# Patient Record
Sex: FEMALE | Race: BLACK OR AFRICAN AMERICAN | Employment: OTHER | URBAN - METROPOLITAN AREA
[De-identification: names, ages, dates, MRNs, and addresses within clinical notes are randomized per-mention and may not be internally consistent; named-entity substitution may affect disease eponyms.]

---

## 2018-06-06 ENCOUNTER — APPOINTMENT (OUTPATIENT)
Dept: MRI IMAGING | Age: 38
End: 2018-06-06
Attending: INTERNAL MEDICINE
Payer: MEDICARE

## 2018-06-06 ENCOUNTER — APPOINTMENT (OUTPATIENT)
Dept: CT IMAGING | Age: 38
End: 2018-06-06
Attending: INTERNAL MEDICINE
Payer: MEDICARE

## 2018-06-06 ENCOUNTER — HOSPITAL ENCOUNTER (OUTPATIENT)
Age: 38
Setting detail: OBSERVATION
Discharge: HOME | End: 2018-06-07
Attending: INTERNAL MEDICINE | Admitting: HOSPITALIST
Payer: MEDICARE

## 2018-06-06 ENCOUNTER — APPOINTMENT (OUTPATIENT)
Dept: GENERAL RADIOLOGY | Age: 38
End: 2018-06-06
Attending: INTERNAL MEDICINE
Payer: MEDICARE

## 2018-06-06 DIAGNOSIS — R41.0 TRANSIENT CONFUSION: ICD-10-CM

## 2018-06-06 DIAGNOSIS — H53.8 BLURRED VISION: ICD-10-CM

## 2018-06-06 DIAGNOSIS — R20.0 FACIAL NUMBNESS: Primary | ICD-10-CM

## 2018-06-06 PROBLEM — G43.109 COMPLICATED MIGRAINE: Status: ACTIVE | Noted: 2018-06-06

## 2018-06-06 PROBLEM — G45.9 TIA (TRANSIENT ISCHEMIC ATTACK): Status: ACTIVE | Noted: 2018-06-06

## 2018-06-06 LAB
ALBUMIN SERPL-MCNC: 3.2 G/DL (ref 3.4–5)
ALBUMIN/GLOB SERPL: 0.6 {RATIO} (ref 0.8–1.7)
ALP SERPL-CCNC: 107 U/L (ref 45–117)
ALT SERPL-CCNC: 48 U/L (ref 13–56)
AMPHET UR QL SCN: NEGATIVE
ANION GAP SERPL CALC-SCNC: 11 MMOL/L (ref 3–18)
APPEARANCE UR: CLEAR
APTT PPP: 27.8 SEC (ref 23–36.4)
AST SERPL-CCNC: 28 U/L (ref 15–37)
BARBITURATES UR QL SCN: NEGATIVE
BASOPHILS # BLD: 0 K/UL (ref 0–0.06)
BASOPHILS NFR BLD: 0 % (ref 0–2)
BENZODIAZ UR QL: NEGATIVE
BILIRUB DIRECT SERPL-MCNC: 0.1 MG/DL (ref 0–0.2)
BILIRUB SERPL-MCNC: 0.2 MG/DL (ref 0.2–1)
BILIRUB UR QL: NEGATIVE
BUN SERPL-MCNC: 11 MG/DL (ref 7–18)
BUN/CREAT SERPL: 12 (ref 12–20)
CALCIUM SERPL-MCNC: 8.9 MG/DL (ref 8.5–10.1)
CANNABINOIDS UR QL SCN: NEGATIVE
CHLORIDE SERPL-SCNC: 103 MMOL/L (ref 100–108)
CK MB CFR SERPL CALC: NORMAL % (ref 0–4)
CK MB SERPL-MCNC: <1 NG/ML (ref 5–25)
CK SERPL-CCNC: 78 U/L (ref 26–192)
CO2 SERPL-SCNC: 24 MMOL/L (ref 21–32)
COCAINE UR QL SCN: NEGATIVE
COLOR UR: YELLOW
CREAT SERPL-MCNC: 0.93 MG/DL (ref 0.6–1.3)
DIFFERENTIAL METHOD BLD: ABNORMAL
EOSINOPHIL # BLD: 0.1 K/UL (ref 0–0.4)
EOSINOPHIL NFR BLD: 2 % (ref 0–5)
ERYTHROCYTE [DISTWIDTH] IN BLOOD BY AUTOMATED COUNT: 13.9 % (ref 11.6–14.5)
GLOBULIN SER CALC-MCNC: 5.5 G/DL (ref 2–4)
GLUCOSE BLD STRIP.AUTO-MCNC: 98 MG/DL (ref 70–110)
GLUCOSE SERPL-MCNC: 101 MG/DL (ref 74–99)
GLUCOSE UR STRIP.AUTO-MCNC: NEGATIVE MG/DL
HCT VFR BLD AUTO: 34.4 % (ref 35–45)
HDSCOM,HDSCOM: NORMAL
HGB BLD-MCNC: 11.5 G/DL (ref 12–16)
HGB UR QL STRIP: NEGATIVE
INR PPP: 1 (ref 0.8–1.2)
KETONES UR QL STRIP.AUTO: NEGATIVE MG/DL
LEUKOCYTE ESTERASE UR QL STRIP.AUTO: NEGATIVE
LYMPHOCYTES # BLD: 2.8 K/UL (ref 0.9–3.6)
LYMPHOCYTES NFR BLD: 37 % (ref 21–52)
MAGNESIUM SERPL-MCNC: 1.7 MG/DL (ref 1.6–2.6)
MCH RBC QN AUTO: 28.7 PG (ref 24–34)
MCHC RBC AUTO-ENTMCNC: 33.4 G/DL (ref 31–37)
MCV RBC AUTO: 85.8 FL (ref 74–97)
METHADONE UR QL: NEGATIVE
MONOCYTES # BLD: 0.5 K/UL (ref 0.05–1.2)
MONOCYTES NFR BLD: 6 % (ref 3–10)
NEUTS SEG # BLD: 4.1 K/UL (ref 1.8–8)
NEUTS SEG NFR BLD: 55 % (ref 40–73)
NITRITE UR QL STRIP.AUTO: NEGATIVE
OPIATES UR QL: NEGATIVE
PCP UR QL: NEGATIVE
PH UR STRIP: 5 [PH] (ref 5–8)
PLATELET # BLD AUTO: 356 K/UL (ref 135–420)
PMV BLD AUTO: 9 FL (ref 9.2–11.8)
POTASSIUM SERPL-SCNC: 3.8 MMOL/L (ref 3.5–5.5)
PROT SERPL-MCNC: 8.7 G/DL (ref 6.4–8.2)
PROT UR STRIP-MCNC: NEGATIVE MG/DL
PROTHROMBIN TIME: 12.7 SEC (ref 11.5–15.2)
RBC # BLD AUTO: 4.01 M/UL (ref 4.2–5.3)
SODIUM SERPL-SCNC: 138 MMOL/L (ref 136–145)
SP GR UR REFRACTOMETRY: >1.03 (ref 1–1.03)
TROPONIN I SERPL-MCNC: <0.02 NG/ML (ref 0–0.06)
UROBILINOGEN UR QL STRIP.AUTO: 1 EU/DL (ref 0.2–1)
WBC # BLD AUTO: 7.4 K/UL (ref 4.6–13.2)

## 2018-06-06 PROCEDURE — 70450 CT HEAD/BRAIN W/O DYE: CPT

## 2018-06-06 PROCEDURE — 70551 MRI BRAIN STEM W/O DYE: CPT

## 2018-06-06 PROCEDURE — 70544 MR ANGIOGRAPHY HEAD W/O DYE: CPT

## 2018-06-06 PROCEDURE — 82550 ASSAY OF CK (CPK): CPT | Performed by: INTERNAL MEDICINE

## 2018-06-06 PROCEDURE — 81003 URINALYSIS AUTO W/O SCOPE: CPT | Performed by: INTERNAL MEDICINE

## 2018-06-06 PROCEDURE — 74011250637 HC RX REV CODE- 250/637: Performed by: INTERNAL MEDICINE

## 2018-06-06 PROCEDURE — 70548 MR ANGIOGRAPHY NECK W/DYE: CPT

## 2018-06-06 PROCEDURE — 99285 EMERGENCY DEPT VISIT HI MDM: CPT

## 2018-06-06 PROCEDURE — 96372 THER/PROPH/DIAG INJ SC/IM: CPT

## 2018-06-06 PROCEDURE — A9577 INJ MULTIHANCE: HCPCS | Performed by: INTERNAL MEDICINE

## 2018-06-06 PROCEDURE — 74011250637 HC RX REV CODE- 250/637: Performed by: HOSPITALIST

## 2018-06-06 PROCEDURE — 99218 HC RM OBSERVATION: CPT

## 2018-06-06 PROCEDURE — 80076 HEPATIC FUNCTION PANEL: CPT | Performed by: INTERNAL MEDICINE

## 2018-06-06 PROCEDURE — 85730 THROMBOPLASTIN TIME PARTIAL: CPT | Performed by: INTERNAL MEDICINE

## 2018-06-06 PROCEDURE — 85610 PROTHROMBIN TIME: CPT | Performed by: INTERNAL MEDICINE

## 2018-06-06 PROCEDURE — 80048 BASIC METABOLIC PNL TOTAL CA: CPT | Performed by: INTERNAL MEDICINE

## 2018-06-06 PROCEDURE — 74011250636 HC RX REV CODE- 250/636: Performed by: HOSPITALIST

## 2018-06-06 PROCEDURE — 93005 ELECTROCARDIOGRAM TRACING: CPT

## 2018-06-06 PROCEDURE — 80307 DRUG TEST PRSMV CHEM ANLYZR: CPT | Performed by: INTERNAL MEDICINE

## 2018-06-06 PROCEDURE — 74011250636 HC RX REV CODE- 250/636: Performed by: INTERNAL MEDICINE

## 2018-06-06 PROCEDURE — 83735 ASSAY OF MAGNESIUM: CPT | Performed by: INTERNAL MEDICINE

## 2018-06-06 PROCEDURE — 82962 GLUCOSE BLOOD TEST: CPT

## 2018-06-06 PROCEDURE — 85025 COMPLETE CBC W/AUTO DIFF WBC: CPT | Performed by: INTERNAL MEDICINE

## 2018-06-06 PROCEDURE — 71045 X-RAY EXAM CHEST 1 VIEW: CPT

## 2018-06-06 RX ORDER — GUAIFENESIN 100 MG/5ML
324 LIQUID (ML) ORAL
Status: COMPLETED | OUTPATIENT
Start: 2018-06-06 | End: 2018-06-06

## 2018-06-06 RX ORDER — BUPROPION HYDROCHLORIDE 150 MG/1
150 TABLET, EXTENDED RELEASE ORAL 2 TIMES DAILY
Status: DISCONTINUED | OUTPATIENT
Start: 2018-06-06 | End: 2018-06-07 | Stop reason: HOSPADM

## 2018-06-06 RX ORDER — FAMOTIDINE 20 MG/1
20 TABLET, FILM COATED ORAL EVERY 12 HOURS
Status: DISCONTINUED | OUTPATIENT
Start: 2018-06-06 | End: 2018-06-07 | Stop reason: HOSPADM

## 2018-06-06 RX ORDER — SODIUM CHLORIDE 9 MG/ML
100 INJECTION, SOLUTION INTRAVENOUS CONTINUOUS
Status: DISCONTINUED | OUTPATIENT
Start: 2018-06-06 | End: 2018-06-07 | Stop reason: HOSPADM

## 2018-06-06 RX ORDER — ACETAMINOPHEN 325 MG/1
650 TABLET ORAL
Status: DISCONTINUED | OUTPATIENT
Start: 2018-06-06 | End: 2018-06-07 | Stop reason: HOSPADM

## 2018-06-06 RX ORDER — GUAIFENESIN 100 MG/5ML
81 LIQUID (ML) ORAL DAILY
Status: DISCONTINUED | OUTPATIENT
Start: 2018-06-07 | End: 2018-06-07 | Stop reason: HOSPADM

## 2018-06-06 RX ORDER — SERTRALINE HYDROCHLORIDE 50 MG/1
100 TABLET, FILM COATED ORAL DAILY
Status: DISCONTINUED | OUTPATIENT
Start: 2018-06-07 | End: 2018-06-07 | Stop reason: HOSPADM

## 2018-06-06 RX ORDER — HEPARIN SODIUM 5000 [USP'U]/ML
5000 INJECTION, SOLUTION INTRAVENOUS; SUBCUTANEOUS EVERY 8 HOURS
Status: DISCONTINUED | OUTPATIENT
Start: 2018-06-06 | End: 2018-06-07 | Stop reason: HOSPADM

## 2018-06-06 RX ORDER — DICYCLOMINE HYDROCHLORIDE 10 MG/1
10 CAPSULE ORAL 4 TIMES DAILY
Status: DISCONTINUED | OUTPATIENT
Start: 2018-06-06 | End: 2018-06-07 | Stop reason: HOSPADM

## 2018-06-06 RX ORDER — SODIUM CHLORIDE 0.9 % (FLUSH) 0.9 %
5-10 SYRINGE (ML) INJECTION EVERY 8 HOURS
Status: DISCONTINUED | OUTPATIENT
Start: 2018-06-06 | End: 2018-06-07 | Stop reason: HOSPADM

## 2018-06-06 RX ORDER — ONDANSETRON 2 MG/ML
4 INJECTION INTRAMUSCULAR; INTRAVENOUS
Status: DISCONTINUED | OUTPATIENT
Start: 2018-06-06 | End: 2018-06-07 | Stop reason: HOSPADM

## 2018-06-06 RX ORDER — KETOROLAC TROMETHAMINE 30 MG/ML
30 INJECTION, SOLUTION INTRAMUSCULAR; INTRAVENOUS
Status: DISCONTINUED | OUTPATIENT
Start: 2018-06-06 | End: 2018-06-07 | Stop reason: HOSPADM

## 2018-06-06 RX ORDER — SODIUM CHLORIDE 0.9 % (FLUSH) 0.9 %
5-10 SYRINGE (ML) INJECTION AS NEEDED
Status: DISCONTINUED | OUTPATIENT
Start: 2018-06-06 | End: 2018-06-07 | Stop reason: HOSPADM

## 2018-06-06 RX ORDER — ATORVASTATIN CALCIUM 20 MG/1
80 TABLET, FILM COATED ORAL
Status: DISCONTINUED | OUTPATIENT
Start: 2018-06-06 | End: 2018-06-07 | Stop reason: HOSPADM

## 2018-06-06 RX ADMIN — DICYCLOMINE HYDROCHLORIDE 10 MG: 10 CAPSULE ORAL at 23:24

## 2018-06-06 RX ADMIN — SODIUM CHLORIDE 100 ML/HR: 900 INJECTION, SOLUTION INTRAVENOUS at 21:18

## 2018-06-06 RX ADMIN — Medication 10 ML: at 21:41

## 2018-06-06 RX ADMIN — ATORVASTATIN CALCIUM 80 MG: 20 TABLET, FILM COATED ORAL at 21:40

## 2018-06-06 RX ADMIN — HEPARIN SODIUM 5000 UNITS: 5000 INJECTION, SOLUTION INTRAVENOUS; SUBCUTANEOUS at 21:40

## 2018-06-06 RX ADMIN — ASPIRIN 324 MG: 81 TABLET, CHEWABLE ORAL at 18:29

## 2018-06-06 RX ADMIN — FAMOTIDINE 20 MG: 20 TABLET, FILM COATED ORAL at 21:40

## 2018-06-06 RX ADMIN — BUPROPION HYDROCHLORIDE 150 MG: 150 TABLET, EXTENDED RELEASE ORAL at 23:24

## 2018-06-06 RX ADMIN — GADOBENATE DIMEGLUMINE 20 ML: 529 INJECTION, SOLUTION INTRAVENOUS at 19:27

## 2018-06-06 NOTE — IP AVS SNAPSHOT
[address] [address] 
[phone] Patient: Mat Diaz MRN: [id number] :[id number] About your hospitalization You were admitted on:  2018 You last received care in the:  [address] [place] [address] You were discharged on:  2018 Why you were hospitalized Your primary diagnosis was:  Tia (Transient Ischemic Attack) Your diagnoses also included:  Complicated Migraine Follow-up Information Follow up With Details Comments Contact Info Dwight Boyce NP On 2018 Follow-up appointment @ 10:10 a.m. [address] [address] 
[phone] Anastasia Ahn MD On 2018 Follow-up appointment @ 11:00 a.m. This is physician's first available appointment. 97 [facility] [phone] [facility] 
[phone] Discharge Orders None A check michelle indicates which time of day the medication should be taken. My Medications CHANGE how you take these medications Instructions Each Dose to Equal  
 Morning Noon Evening Bedtime  
 acetaminophen 500 mg tablet Commonly known as:  TYLENOL What changed:  medication strength Take 1 Tab by mouth every four (4) hours as needed for Pain. 500 mg CONTINUE taking these medications Instructions Each Dose to Equal  
 Morning Noon Evening Bedtime ALLEGRA 180 mg tablet Generic drug:  fexofenadine Take  by mouth daily. buPROPion  mg SR tablet Commonly known as:  Lala Ambrosia Take  by mouth two (2) times a day. dicyclomine 10 mg capsule Commonly known as:  BENTYL Take 10 mg by mouth 4 times daily (before meals and nightly). 10 mg  
    
   
   
   
  
 esomeprazole 40 mg capsule Commonly known as:  Matute Brod Take  by mouth daily. traZODone 100 mg tablet Commonly known as:  Joey Lane Take 100 mg by mouth nightly. 100 mg  
    
   
   
   
  
 VITAMIN D2 50,000 unit capsule Generic drug:  ergocalciferol Take 50,000 Units by mouth. 02975 Units WOMEN'S DAILY PO Take  by mouth daily. ZOLOFT 100 mg tablet Generic drug:  sertraline Take  by mouth daily. STOP taking these medications FLEXERIL PO  
   
  
 HYDROcodone-acetaminophen 5-325 mg per tablet Commonly known as:  NORCO  
   
  
 ondansetron 4 mg disintegrating tablet Commonly known as:  ZOFRAN ODT PERCOCET 5-325 mg per tablet Generic drug:  oxyCODONE-acetaminophen Where to Get Your Medications These medications were sent to [address] [place] [address] Hours:  24-hours Phone:  [phone]  
  acetaminophen 500 mg tablet Discharge Instructions Migraine Headache: Care Instructions Your Care Instructions Migraines are painful, throbbing headaches that often start on one side of the head. They may cause nausea and vomiting and make you sensitive to light, sound, or smell. Without treatment, migraines can last from 4 hours to a few days. Medicines can help prevent migraines or stop them after they have started. Your doctor can help you find which ones work best for you. Follow-up care is a key part of your treatment and safety. Be sure to make and go to all appointments, and call your doctor if you are having problems. It's also a good idea to know your test results and keep a list of the medicines you take. How can you care for yourself at home? · Do not drive if you have taken a prescription pain medicine. · Rest in a quiet, dark room until your headache is gone. Close your eyes, and try to relax or go to sleep. Don't watch TV or read. · Put a cold, moist cloth or cold pack on the painful area for 10 to 20 minutes at a time. Put a thin cloth between the cold pack and your skin. · Use a warm, moist towel or a heating pad set on low to relax tight shoulder and neck muscles. · Have someone gently massage your neck and shoulders. · Take your medicines exactly as prescribed. Call your doctor if you think you are having a problem with your medicine. You will get more details on the specific medicines your doctor prescribes. · Be careful not to take pain medicine more often than the instructions allow. You could get worse or more frequent headaches when the medicine wears off. To prevent migraines · Keep a headache diary so you can figure out what triggers your headaches. Avoiding triggers may help you prevent headaches. Record when each headache began, how long it lasted, and what the pain was like. (Was it throbbing, aching, stabbing, or dull?) Write down any other symptoms you had with the headache, such as nausea, flashing lights or dark spots, or sensitivity to bright light or loud noise. Note if the headache occurred near your period. List anything that might have triggered the headache. Triggers may include certain foods (chocolate, cheese, wine) or odors, smoke, bright light, stress, or lack of sleep. · If your doctor has prescribed medicine for your migraines, take it as directed. You may have medicine that you take only when you get a migraine and medicine that you take all the time to help prevent migraines. ¨ If your doctor has prescribed medicine for when you get a headache, take it at the first sign of a migraine, unless your doctor has given you other instructions. ¨ If your doctor has prescribed medicine to prevent migraines, take it exactly as prescribed. Call your doctor if you think you are having a problem with your medicine. · Find healthy ways to deal with stress.  Migraines are most common during or right after stressful times. Take time to relax before and after you do something that has caused a migraine in the past. 
· Try to keep your muscles relaxed by keeping good posture. Check your jaw, face, neck, and shoulder muscles for tension. Try to relax them. When you sit at a desk, change positions often. And make sure to stretch for 30 seconds each hour. · Get plenty of sleep and exercise. · Eat meals on a regular schedule. Avoid foods and drinks that often trigger migraines. These include chocolate, alcohol (especially red wine and port), aspartame, monosodium glutamate (MSG), and some additives found in foods (such as hot dogs, cohn, cold cuts, aged cheeses, and pickled foods). · Limit caffeine. Don't drink too much coffee, tea, or soda. But don't quit caffeine suddenly. That can also give you migraines. · Do not smoke or allow others to smoke around you. If you need help quitting, talk to your doctor about stop-smoking programs and medicines. These can increase your chances of quitting for good. · If you are taking birth control pills or hormone therapy, talk to your doctor about whether they are triggering your migraines. When should you call for help? Call 911 anytime you think you may need emergency care. For example, call if: 
? · You have signs of a stroke. These may include: 
¨ Sudden numbness, paralysis, or weakness in your face, arm, or leg, especially on only one side of your body. ¨ Sudden vision changes. ¨ Sudden trouble speaking. ¨ Sudden confusion or trouble understanding simple statements. ¨ Sudden problems with walking or balance. ¨ A sudden, severe headache that is different from past headaches. ?Call your doctor now or seek immediate medical care if: 
? · You have new or worse nausea and vomiting. ? · You have a new or higher fever. ? · Your headache gets much worse. ? Watch closely for changes in your health, and be sure to contact your doctor if: ? · You are not getting better after 2 days (48 hours). Where can you learn more? Go to http://zuhair-tin.info/. Enter O341 in the search box to learn more about \"Migraine Headache: Care Instructions. \" Current as of: October 14, 2016 Content Version: 11.4 © 0084-4740 [employer]. Care instructions adapted under license by [employer] (which disclaims liability or warranty for this information). If you have questions about a medical condition or this instruction, always ask your healthcare professional. [place] [address] any warranty or liability for your use of this information. Patient armband removed and shredded DISCHARGE SUMMARY from Nurse PATIENT INSTRUCTIONS: 
 
After general anesthesia or intravenous sedation, for 24 hours or while taking prescription Narcotics: · Limit your activities · Do not drive and operate hazardous machinery · Do not make important personal or business decisions · Do  not drink alcoholic beverages · If you have not urinated within 8 hours after discharge, please contact your surgeon on call. Report the following to your surgeon: 
· Excessive pain, swelling, redness or odor of or around the surgical area · Temperature over 100.5 · Nausea and vomiting lasting longer than 4 hours or if unable to take medications · Any signs of decreased circulation or nerve impairment to extremity: change in color, persistent  numbness, tingling, coldness or increase pain · Any questions What to do at Home: 
Recommended activity: Activity as tolerated If you experience any of the following symptoms heartburn not relieved with over the counter medications or headache not relieved with rest or medication, please follow up with primary care provider. *  Please give a list of your current medications to your Primary Care Provider.  
 
*  Please update this list whenever your medications are discontinued, doses are 
 changed, or new medications (including over-the-counter products) are added. *  Please carry medication information at all times in case of emergency situations. These are general instructions for a healthy lifestyle: No smoking/ No tobacco products/ Avoid exposure to second hand smoke Surgeon General's Warning:  Quitting smoking now greatly reduces serious risk to your health. Obesity, smoking, and sedentary lifestyle greatly increases your risk for illness A healthy diet, regular physical exercise & weight monitoring are important for maintaining a healthy lifestyle You may be retaining fluid if you have a history of heart failure or if you experience any of the following symptoms:  Weight gain of 3 pounds or more overnight or 5 pounds in a week, increased swelling in our hands or feet or shortness of breath while lying flat in bed. Please call your doctor as soon as you notice any of these symptoms; do not wait until your next office visit. Recognize signs and symptoms of STROKE: 
 
F-face looks uneven A-arms unable to move or move unevenly S-speech slurred or non-existent T-time-call 911 as soon as signs and symptoms begin-DO NOT go Back to bed or wait to see if you get better-TIME IS BRAIN. Warning Signs of HEART ATTACK Call 911 if you have these symptoms: 
? Chest discomfort. Most heart attacks involve discomfort in the center of the chest that lasts more than a few minutes, or that goes away and comes back. It can feel like uncomfortable pressure, squeezing, fullness, or pain. ? Discomfort in other areas of the upper body. Symptoms can include pain or discomfort in one or both arms, the back, neck, jaw, or stomach. ? Shortness of breath with or without chest discomfort. ? Other signs may include breaking out in a cold sweat, nausea, or lightheadedness. Don't wait more than five minutes to call [address]!  Fast action can save your life. Calling 911 is almost always the fastest way to get lifesaving treatment. Emergency Medical Services staff can begin treatment when they arrive  up to an hour sooner than if someone gets to the hospital by car. The discharge information has been reviewed with the patient. The patient verbalized understanding. Discharge medications reviewed with the patient and appropriate educational materials and side effects teaching were provided. ___________________________________________________________________________________________________________________________________ Page[employer] People[employer] Announcement We are excited to announce that we are making your provider's discharge notes available to you in [facility]. You will see these notes when they are completed and signed by the physician that discharged you from your recent hospital stay. If you have any questions or concerns about any information you see in [facility], please call the Health Information Department where you were seen or reach out to your Primary Care Provider for more information about your plan of care. Introducing [facility]! [employer] introduces [employer] patient portal. Now you can access parts of your medical record, email your doctor's office, and request medication refills online. 1. In your internet browser, go to [email or web address] 2. Click on the First Time User? Click Here link in the Sign In box. You will see the New Member Sign Up page. 3. Enter your [facility] Access Code exactly as it appears below. You will not need to use this code after youve completed the sign-up process. If you do not sign up before the expiration date, you must request a new code. · [facility] Access Code: [email or web address] Expires: 9/4/2018  5:41 PM 
 
4. Enter the last four digits of your Social Security Number (xxxx) and Date of Birth (mm/dd/yyyy) as indicated and click Submit.  You will be taken to the next sign-up page. 5. Create a [employer]utor[employer] ID. This will be your [employer] login ID and cannot be changed, so think of one that is secure and easy to remember. 6. Create a [employer] password. You can change your password at any time. 7. Enter your Password Reset Question and Answer. This can be used at a later time if you forget your password. 8. Enter your e-mail address. You will receive e-mail notification when new information is available in [facility] 9. Click Sign Up. You can now view and download portions of your medical record. 10. Click the Download Summary menu link to download a portable copy of your medical information. If you have questions, please visit the Frequently Asked Questions section of the [employer] website. Remember, [employer] is NOT to be used for urgent needs. For medical emergencies, dial 911. Now available from your iPhone and Android! Introducing Juan Pablo Regan As a [facility] patient, I wanted to make you aware of our electronic visit tool called Juan Pablo Regan. [address] 24/7 allows you to connect within minutes with a medical provider 24 hours a day, seven days a week via a mobile device or tablet or logging into a secure website from your computer. You can access Juan Pablo Regan from anywhere in the [place]. A virtual visit might be right for you when you have a simple condition and feel like you just dont want to get out of bed, or cant get away from work for an appointment, when your regular [facility] provider is not available (evenings, weekends or holidays), or when youre out of town and need minor care. Electronic visits cost only $49 and if the [facility] 24/7 provider determines a prescription is needed to treat your condition, one can be electronically transmitted to a nearby pharmacy*. Please take a moment to enroll today if you have not already done so.   The enrollment process is free and takes just a few minutes. To enroll, please download the [employer] 24/7 zen to your tablet or phone, or visit [email or web address] to enroll on your computer. And, as an [address] [place] [address] patient with a [employer] account, the results of your visits will be scanned into your electronic medical record and your primary care provider will be able to view the scanned results. We urge you to continue to see your regular [employer] provider for your ongoing medical care. And while your primary care provider may not be the one available when you seek a [facility] virtual visit, the peace of mind you get from getting a real diagnosis real time can be priceless. For more information on [employer], view our Frequently Asked Questions (FAQs) at [email or web address]. Sincerely, 
 
Chanda Mauro MD 
Chief Medical Officer XiangGwendolyn Salomon Reinaldo *:  certain medications cannot be prescribed via [facility] Unresulted Labs-Please follow up with your PCP about these lab tests Order Current Status EKG, 12 LEAD, INITIAL Preliminary result Providers Seen During Your Hospitalization Provider Specialty Primary office phone Lauren Bear MD Emergency Medicine [phone] David Singer MD Family Practice [phone] Your Primary Care Physician (PCP) Primary Care Physician Office Phone Office Fax Rima Mayer [phone] You are allergic to the following Allergen Reactions Shellfish Derived Angioedema Sterapred (Prednisone) Not Reported This Time Recent Documentation Height Weight Breastfeeding? BMI OB Status Smoking Status 1.702 m 95.3 kg No 32.89 kg/m2 Hysterectomy Never Smoker Emergency Contacts Name Discharge Info Relation Home Work Mobile [address] [facility] CAREGIVER [3] Other Relative [6] [phone] Patient Belongings The following personal items are in your possession at time of discharge: 
  Dental Appliances: None  Visual Aid: Glasses (READING)      Home Medications: None   Jewelry: None  Clothing: Undergarments, Footwear, Pants, Shirt, Shorts, With patient    Other Valuables: Ken Boucher [employer], Ian Medal, Money (comment) Please provide this summary of care documentation to your next provider. Signatures-by signing, you are acknowledging that this After Visit Summary has been reviewed with you and you have received a copy. Patient Signature:  ____________________________________________________________ Date:  ____________________________________________________________  
  
[facility] Provider Signature:  ____________________________________________________________ Date:  ____________________________________________________________

## 2018-06-06 NOTE — ED PROVIDER NOTES
EMERGENCY DEPARTMENT HISTORY AND PHYSICAL EXAM    Date: 6/6/2018  Patient Name: Thania Mares    History of Presenting Illness     No chief complaint on file. History Provided By: Patient    Chief Complaint: Numbness  Duration: 30 Minutes  Timing:  Acute  Location: Left face radiating down to left neck  Modifying Factors: Pt states no worsening or relieving sx  Associated Symptoms: confusion, pressure behind left eye, headache and blurred vision    Additional History (Context):   5:46 PM  Thania Mares is a 43 y.o. female with PMHX of IBS who presents to the emergency department C/O left face numbness radiating down to left neck onset 30 minutes ago. Associated sxs include confusion, pressure behind left eye, headache, blurred vision. Pt denies recent fall, PMHx DM, heart problems or stroke, CP, and any other sxs or complaints.      PCP: Ketty Tesfaye NP    Current Facility-Administered Medications   Medication Dose Route Frequency Provider Last Rate Last Dose    0.9% NaCl bacteriostatic (NORMAL SALINE) 0.9 % injection             0.9% sodium chloride infusion  100 mL/hr IntraVENous CONTINUOUS Sekou Schulz  mL/hr at 06/06/18 2118 100 mL/hr at 06/06/18 2118    sodium chloride (NS) flush 5-10 mL  5-10 mL IntraVENous Q8H Sekou Schulz MD   10 mL at 06/07/18 0600    sodium chloride (NS) flush 5-10 mL  5-10 mL IntraVENous PRN Sekou Schulz MD        aspirin chewable tablet 81 mg  81 mg Oral DAILY Sekou Schulz MD   81 mg at 06/07/18 1109    atorvastatin (LIPITOR) tablet 80 mg  80 mg Oral QHS Sekou Schulz MD   80 mg at 06/06/18 2140    acetaminophen (TYLENOL) tablet 650 mg  650 mg Oral Q4H PRN Sekou Schulz MD   650 mg at 06/07/18 1109    famotidine (PEPCID) tablet 20 mg  20 mg Oral Q12H Sekou Schulz MD   20 mg at 06/07/18 1109    heparin (porcine) injection 5,000 Units  5,000 Units SubCUTAneous Karley Mesa MD   5,000 Units at 06/07/18 0357    ondansetron (ZOFRAN) injection 4 mg  4 mg IntraVENous Q6H PRN Lena Mejia MD        buPROPion SR [facility]) tablet 150 mg  150 mg Oral BID Lena Mejia MD   150 mg at 06/07/18 1109    dicyclomine (BENTYL) capsule 10 mg  10 mg Oral QID Lena Mejia MD   10 mg at 06/07/18 1109    sertraline (ZOLOFT) tablet 100 mg  100 mg Oral DAILY Lena Mejia MD   100 mg at 06/07/18 1109    ketorolac (TORADOL) injection 30 mg  30 mg IntraVENous Q6H PRN Lena Mejia MD        acetaminophen (TYLENOL) tablet 650 mg  650 mg Oral Q6H PRN Lena Mejia MD        ondansetron [facility]) injection 4 mg  4 mg IntraVENous Q6H PRN Lena Mejia MD           Past History     Past Medical History:  Past Medical History:   Diagnosis Date    Arthropathy     GERD (gastroesophageal reflux disease)     IBS (irritable bowel syndrome)     Mental disorder     Psychiatric disorder     PTSD (post-traumatic stress disorder)     UTI (lower urinary tract infection)        Past Surgical History:  Past Surgical History:   Procedure Laterality Date    CYSTOURETHROSCOPY  7/1/2015         HX CARPAL TUNNEL RELEASE      HX HERNIA REPAIR      HX HYSTERECTOMY      HX KNEE ARTHROSCOPY      Right       Family History:  History reviewed. No pertinent family history. Social History:  Social History   Substance Use Topics    Smoking status: Never Smoker    Smokeless tobacco: Never Used    Alcohol use 0.0 oz/week     0 Standard drinks or equivalent per week      Comment: socially        Allergies: Allergies   Allergen Reactions    Shellfish Derived Angioedema    Sterapred [Prednisone] Not Reported This Time         Review of Systems   Review of Systems   Eyes: Positive for pain (pain behind left eye) and visual disturbance. Cardiovascular: Negative for chest pain. Neurological: Positive for weakness (left sided), numbness (left sided) and headaches. Psychiatric/Behavioral: Positive for confusion. All other systems reviewed and are negative.       Physical Exam     Vitals: 06/07/18 0547 06/07/18 0629 06/07/18 0728 06/07/18 1155   BP: 100/60 99/62 99/69 127/87   Pulse: 78 79 74 89   Resp: 16 17 17 17   Temp: 97.8 °F (36.6 °C) 97.6 °F (36.4 °C) 98 °F (36.7 °C) 97.4 °F (36.3 °C)   SpO2: 100% 100% 100% 100%   Weight:       Height:         Physical Exam   Constitutional: She is oriented to person, place, and time. She appears well-developed and well-nourished. HENT:   Head: Normocephalic and atraumatic. Right Ear: External ear normal.   Left Ear: External ear normal.   Nose: Nose normal.   Mouth/Throat: Oropharynx is clear and moist.   Eyes: Conjunctivae and EOM are normal. Pupils are equal, round, and reactive to light. Right eye exhibits no discharge. Left eye exhibits no discharge. No scleral icterus. Neck: Normal range of motion. Neck supple. No JVD present. No tracheal deviation present. Cardiovascular: Normal rate, regular rhythm, normal heart sounds and intact distal pulses. Pulmonary/Chest: Effort normal and breath sounds normal.   Abdominal: Soft. Bowel sounds are normal. She exhibits no distension. There is no tenderness. No HSM   Musculoskeletal: Normal range of motion. Neurological: She is alert and oriented to person, place, and time. She has normal reflexes. No focal motor weakness. Skin: Skin is warm and dry. No rash noted. Psychiatric: Her behavior is normal.   Flat affect   Nursing note and vitals reviewed.     Diagnostic Study Results     Labs -     Recent Results (from the past 12 hour(s))   HEMOGLOBIN A1C WITH EAG    Collection Time: 06/07/18  2:58 AM   Result Value Ref Range    Hemoglobin A1c 5.2 4.5 - 5.6 %    Est. average glucose 800 mg/dL   METABOLIC PANEL, BASIC    Collection Time: 06/07/18  2:58 AM   Result Value Ref Range    Sodium 139 136 - 145 mmol/L    Potassium 4.1 3.5 - 5.5 mmol/L    Chloride 105 100 - 108 mmol/L    CO2 27 21 - 32 mmol/L    Anion gap 7 3.0 - 18 mmol/L    Glucose 117 (H) 74 - 99 mg/dL    BUN 9 7.0 - 18 MG/DL Creatinine 0.90 0.6 - 1.3 MG/DL    BUN/Creatinine ratio 10 (L) 12 - 20      GFR est AA >60 >60 ml/min/1.73m2    GFR est non-AA >60 >60 ml/min/1.73m2    Calcium 8.5 8.5 - 10.1 MG/DL   CBC W/O DIFF    Collection Time: 06/07/18  2:58 AM   Result Value Ref Range    WBC 7.6 4.6 - 13.2 K/uL    RBC 3.82 (L) 4.20 - 5.30 M/uL    HGB 10.8 (L) 12.0 - 16.0 g/dL    HCT 33.2 (L) 35.0 - 45.0 %    MCV 86.9 74.0 - 97.0 FL    MCH 28.3 24.0 - 34.0 PG    MCHC 32.5 31.0 - 37.0 g/dL    RDW 14.1 11.6 - 14.5 %    PLATELET 207 546 - 217 K/uL    MPV 9.1 (L) 9.2 - 11.8 FL   LIPID PANEL    Collection Time: 06/07/18  2:58 AM   Result Value Ref Range    LIPID PROFILE          Cholesterol, total 153 <200 MG/DL    Triglyceride 96 <150 MG/DL    HDL Cholesterol 40 40 - 60 MG/DL    LDL, calculated 93.8 0 - 100 MG/DL    VLDL, calculated 19.2 MG/DL    CHOL/HDL Ratio 3.8 0 - 5.0         Radiologic Studies -   7:13 PM  RADIOLOGY FINDINGS  Chest X-ray shows no acute process  Pending review by Radiologist  Recorded by Oliverio Trammell, ED Scribe, as dictated by Minnie Francisco MD    MRA NECK W CONT   Final Result      MRA BRAIN WO CONT   Final Result      MRI BRAIN WO CONT   Final Result      XR CHEST PORT   Final Result      CT HEAD WO CONT   Final Result        CT Results  (Last 48 hours)               06/06/18 1803  CT HEAD WO CONT Final result    Impression:  IMPRESSION:       1. No acute intracranial abnormalities. These findings were discussed with Dr. Haylie Caceres at [facility] PM on 6/6/2018. Narrative:  EXAM: CT head       INDICATION: Left facial weakness, confusion, headache. COMPARISON: None. TECHNIQUE: Axial CT imaging of the head was performed without intravenous   contrast. One or more dose reduction techniques were used on this CT: automated   exposure control, adjustment of the mAs and/or kVp according to patient's size,   and iterative reconstruction techniques.  The specific techniques utilized on   this CT exam have been documented in the patient's electronic medical record.       _______________       FINDINGS:       BRAIN AND EXTRA-AXIAL SPACES: There is no intracranial hemorrhage, mass effect,   or midline shift. There are no areas of abnormal parenchymal attenuation. There   are no abnormal extra-axial fluid collections. CALVARIUM: Intact. SINUSES: Clear. OTHER: None.       _______________               CXR Results  (Last 48 hours)               06/06/18 1829  XR CHEST PORT Final result    Impression:  Impression:   --------------       No active pulmonary disease. Narrative:  ---------------------------------------------------------------------------   <<<<<<<<<           [facility]           >>>>>>>>>    ---------------------------------------------------------------------------       CLINICAL HISTORY:  Chest pain. Shortness of breath. COMPARISON EXAMINATIONS:  None. ---  SINGLE FRONTAL VIEW OF THE CHEST  ---       The lungs and pleural spaces are clear. The mediastinum is unremarkable in   appearance.   No significant osseous abnormalities are identified.             --------------             Medications given in the ED-  Medications   0.9% sodium chloride infusion (100 mL/hr IntraVENous New Bag 6/6/18 2118)   sodium chloride (NS) flush 5-10 mL (10 mL IntraVENous Given 6/7/18 0600)   sodium chloride (NS) flush 5-10 mL (not administered)   aspirin chewable tablet 81 mg (81 mg Oral Given 6/7/18 1109)   atorvastatin (LIPITOR) tablet 80 mg (80 mg Oral Given 6/6/18 2140)   acetaminophen (TYLENOL) tablet 650 mg (650 mg Oral Given 6/7/18 1109)   famotidine (PEPCID) tablet 20 mg (20 mg Oral Given 6/7/18 1109)   heparin (porcine) injection 5,000 Units (5,000 Units SubCUTAneous Given 6/7/18 7059)   ondansetron (ZOFRAN) injection 4 mg (not administered)   buPROPion SR (WELLBUTRIN SR) tablet 150 mg (150 mg Oral Given 6/7/18 1109)   dicyclomine (BENTYL) capsule 10 mg (10 mg Oral Given 6/7/18 1109)   sertraline (ZOLOFT) tablet 100 mg (100 mg Oral Given 6/7/18 1109)   ketorolac (TORADOL) injection 30 mg (not administered)   acetaminophen (TYLENOL) tablet 650 mg (not administered)   ondansetron (ZOFRAN) injection 4 mg (not administered)   0.9% NaCl bacteriostatic (NORMAL SALINE) 0.9 % injection (not administered)   aspirin chewable tablet 324 mg (324 mg Oral Given 6/6/18 1829)   Gadobenate Dimeglumine (MULTIHANCE) 529 mg/mL (0.1mmol/0.2mL) contrast injection 20 mL (20 mL IntraVENous Given 6/6/18 1927)   0.9% NaCl bacteriostatic (NORMAL SALINE) 0.9 % injection 15 mL (15 mL IntraVENous Given 6/7/18 1026)         Medical Decision Making   I am the first provider for this patient. I reviewed the vital signs, available nursing notes, past medical history, past surgical history, family history and social history. Vital Signs-Reviewed the patient's vital signs. Pulse Oximetry Analysis - 100% on Room Air     EKG interpretation: (Preliminary)  6:23 PM   76 BPM, NSR, no ST elevation  EKG read by Nava Mondragon MD at 6:25 PM     Records Reviewed: Nursing Notes    Provider Notes (Medical Decision Making):   DDx: Chantelle Keepers, cva, stroke, migraine headache, conversion disorder, psych    Procedures:  Procedures    ED Course:   5:46 PM   Initial assessment performed. The patients presenting problems have been discussed, and they are in agreement with the care plan formulated and outlined with them. I have encouraged them to ask questions as they arise throughout their visit.    6:02 PM  The radiologist called to say the CT showed no acute process. CONSULT NOTE:   6:22 Blanka Spencer MD spoke with Dr. Lucille Randall MD   Specialty: Tele neurologist  Discussed pt's hx, disposition, and available diagnostic and imaging results  over the telephone. Reviewed care plans. Consulting physician agrees with plans as outlined.   Give 324 of aspirin, needs to admitted for stroke workup and do not cancel code s. Ddx: tia, seizure, complicated migraine. Will need head and neck MRI, also needs eeg, echo, and neurologist evaluation as inpatient or outpatient. CONSULT NOTE:   6:38 PM  Kamla Walton MD spoke with Dr. Randall Kehr, MD   Specialty: Tele neurologist  Discussed pt's hx, disposition, and available diagnostic and imaging results  over the telephone. Reviewed care plans. Consulting physician agrees with plans as outlined. Confirmed that she wants MRI head without and MRA brain and neck. CONSULT NOTE:   7:11 PM  Kamla Walton MD spoke with Alan Richardson MD   Specialty: Internal Medicine  Discussed pt's hx, disposition, and available diagnostic and imaging results  over the telephone. Reviewed care plans. Consulting physician agrees with plans as outlined. She agrees to admit to tele obs.      7:43 PM  Preliminary MRI head and MRA head and neck results are negative per radiologist reading, will have neuroradiologist evaluate the films in AM next day. Diagnosis and Disposition       Core Measures:  For Hospitalized Patients:    1. Hospitalization Decision Time:  The decision to hospitalize the patient was made by Kamla Walton MD at 6:22 PM on 6/6/2018    2. Aspirin: Aspirin was given    7:11 PM  Patient is being admitted to the hospital by Alan iRchardson MD. The results of their tests and reasons for their admission have been discussed with them and/or available family. They convey agreement and understanding for the need to be admitted and for their admission diagnosis. CLINICAL IMPRESSION:    1. Facial numbness    2. Blurred vision    3. Transient confusion        PLAN:    1. Admit to Tele Obs.  _______________________________    Attestations: This note is prepared by Shin Bailey, acting as Scribe for Kamla Walotn MD.    Kamla Walton MD:  The scribe's documentation has been prepared under my direction and personally reviewed by me in its entirety.   I confirm that the note above accurately reflects all work, treatment, procedures, and medical decision making performed by me.  _______________________________

## 2018-06-06 NOTE — IP AVS SNAPSHOT
Charli Joyce 
 
 
 [address] [place] [id number] 
[phone] Patient: Talia Dupont MRN: [id number] :1980 A check michelle indicates which time of day the medication should be taken. My Medications CHANGE how you take these medications Instructions Each Dose to Equal  
 Morning Noon Evening Bedtime  
 acetaminophen 500 mg tablet Commonly known as:  TYLENOL What changed:  medication strength Take 1 Tab by mouth every four (4) hours as needed for Pain. 500 mg CONTINUE taking these medications Instructions Each Dose to Equal  
 Morning Noon Evening Bedtime ALLEGRA 180 mg tablet Generic drug:  fexofenadine Take  by mouth daily. buPROPion  mg SR tablet Commonly known as:  Krissy Drain Take  by mouth two (2) times a day. dicyclomine 10 mg capsule Commonly known as:  BENTYL Take 10 mg by mouth 4 times daily (before meals and nightly). 10 mg  
    
   
   
   
  
 esomeprazole 40 mg capsule Commonly known as:  Juanetta Budds Take  by mouth daily. traZODone 100 mg tablet Commonly known as:  Noreene Wilfred Take 100 mg by mouth nightly. 100 mg  
    
   
   
   
  
 VITAMIN D2 50,000 unit capsule Generic drug:  ergocalciferol Take 50,000 Units by mouth. 93320 Units WOMEN'S DAILY PO Take  by mouth daily. ZOLOFT 100 mg tablet Generic drug:  sertraline Take  by mouth daily. STOP taking these medications FLEXERIL PO  
   
  
 HYDROcodone-acetaminophen 5-325 mg per tablet Commonly known as:  NORCO  
   
  
 ondansetron 4 mg disintegrating tablet Commonly known as:  ZOFRAN ODT PERCOCET 5-325 mg per tablet Generic drug:  oxyCODONE-acetaminophen Where to Get Your Medications These medications were sent to [id number] [place] [address], [address], [facility] Josselyn Mak [address] Hours:  24-hours Phone:  [phone]  
  acetaminophen 500 mg tablet

## 2018-06-06 NOTE — PROGRESS NOTES
Preliminary interpretation for MRI/MRA brain dated June 6, 2018 and MRA neck dated June 6, 2018    No acute abnormalities identified in the brain. No evidence of acute infarct, hemorrhage, or mass effect. No aneurysm involving the intracranial vasculature. No high-grade stenosis or dissection involving the intracranial or extracranial vasculature. Full report by neuroradiology to follow on June 7, 2018. Discussed with Dr. Ron Sloan at 7:42 PM on 6/6/2018.

## 2018-06-06 NOTE — Clinical Note
Patient Class[de-identified] Observation [873] Type of Bed: Telemetry [19] Reason for Observation: weakness/numb left facial, blurred vision, dizziness, - tia vs sz, migraine Admitting Diagnosis: TIA (transient ischemic attack) [605403] Admitting Physician: Elizabeth Eisenberg [[id number]] Attending Physician: Elizabeth Eisenberg [[id number]]

## 2018-06-06 NOTE — ED NOTES
Bedside shift change report given to Timo kapadia (oncoming nurse) by yesenia robertson (offgoing nurse). Report included the following information SBAR, Kardex, ED Summary, MAR, Recent Results and Cardiac Rhythm NSR.

## 2018-06-06 NOTE — ED TRIAGE NOTES
Patient states left sided facial and ear numbness. Patient states pressure behind left eye. Onset 30 minutes ago.

## 2018-06-07 VITALS
DIASTOLIC BLOOD PRESSURE: 87 MMHG | WEIGHT: 210 LBS | RESPIRATION RATE: 17 BRPM | SYSTOLIC BLOOD PRESSURE: 127 MMHG | OXYGEN SATURATION: 100 % | TEMPERATURE: 97.4 F | HEIGHT: 67 IN | HEART RATE: 89 BPM | BODY MASS INDEX: 32.96 KG/M2

## 2018-06-07 LAB
ANION GAP SERPL CALC-SCNC: 7 MMOL/L (ref 3–18)
BUN SERPL-MCNC: 9 MG/DL (ref 7–18)
BUN/CREAT SERPL: 10 (ref 12–20)
CALCIUM SERPL-MCNC: 8.5 MG/DL (ref 8.5–10.1)
CHLORIDE SERPL-SCNC: 105 MMOL/L (ref 100–108)
CHOLEST SERPL-MCNC: 153 MG/DL
CO2 SERPL-SCNC: 27 MMOL/L (ref 21–32)
CREAT SERPL-MCNC: 0.9 MG/DL (ref 0.6–1.3)
ERYTHROCYTE [DISTWIDTH] IN BLOOD BY AUTOMATED COUNT: 14.1 % (ref 11.6–14.5)
EST. AVERAGE GLUCOSE BLD GHB EST-MCNC: 103 MG/DL
GLUCOSE SERPL-MCNC: 117 MG/DL (ref 74–99)
HBA1C MFR BLD: 5.2 % (ref 4.5–5.6)
HCT VFR BLD AUTO: 33.2 % (ref 35–45)
HDLC SERPL-MCNC: 40 MG/DL (ref 40–60)
HDLC SERPL: 3.8 {RATIO} (ref 0–5)
HGB BLD-MCNC: 10.8 G/DL (ref 12–16)
LDLC SERPL CALC-MCNC: 93.8 MG/DL (ref 0–100)
LIPID PROFILE,FLP: NORMAL
MCH RBC QN AUTO: 28.3 PG (ref 24–34)
MCHC RBC AUTO-ENTMCNC: 32.5 G/DL (ref 31–37)
MCV RBC AUTO: 86.9 FL (ref 74–97)
PLATELET # BLD AUTO: 344 K/UL (ref 135–420)
PMV BLD AUTO: 9.1 FL (ref 9.2–11.8)
POTASSIUM SERPL-SCNC: 4.1 MMOL/L (ref 3.5–5.5)
RBC # BLD AUTO: 3.82 M/UL (ref 4.2–5.3)
SODIUM SERPL-SCNC: 139 MMOL/L (ref 136–145)
TRIGL SERPL-MCNC: 96 MG/DL (ref ?–150)
VLDLC SERPL CALC-MCNC: 19.2 MG/DL
WBC # BLD AUTO: 7.6 K/UL (ref 4.6–13.2)

## 2018-06-07 PROCEDURE — 80048 BASIC METABOLIC PNL TOTAL CA: CPT | Performed by: HOSPITALIST

## 2018-06-07 PROCEDURE — G8989 SELF CARE D/C STATUS: HCPCS

## 2018-06-07 PROCEDURE — 74011000250 HC RX REV CODE- 250: Performed by: HOSPITALIST

## 2018-06-07 PROCEDURE — 80061 LIPID PANEL: CPT | Performed by: HOSPITALIST

## 2018-06-07 PROCEDURE — 97161 PT EVAL LOW COMPLEX 20 MIN: CPT

## 2018-06-07 PROCEDURE — G8998 SWALLOW D/C STATUS: HCPCS

## 2018-06-07 PROCEDURE — G8987 SELF CARE CURRENT STATUS: HCPCS

## 2018-06-07 PROCEDURE — G8988 SELF CARE GOAL STATUS: HCPCS

## 2018-06-07 PROCEDURE — 74011250637 HC RX REV CODE- 250/637: Performed by: HOSPITALIST

## 2018-06-07 PROCEDURE — 96372 THER/PROPH/DIAG INJ SC/IM: CPT

## 2018-06-07 PROCEDURE — 97165 OT EVAL LOW COMPLEX 30 MIN: CPT

## 2018-06-07 PROCEDURE — G8978 MOBILITY CURRENT STATUS: HCPCS

## 2018-06-07 PROCEDURE — 83036 HEMOGLOBIN GLYCOSYLATED A1C: CPT | Performed by: HOSPITALIST

## 2018-06-07 PROCEDURE — 92610 EVALUATE SWALLOWING FUNCTION: CPT

## 2018-06-07 PROCEDURE — G8979 MOBILITY GOAL STATUS: HCPCS

## 2018-06-07 PROCEDURE — 85027 COMPLETE CBC AUTOMATED: CPT | Performed by: HOSPITALIST

## 2018-06-07 PROCEDURE — 74011250636 HC RX REV CODE- 250/636: Performed by: HOSPITALIST

## 2018-06-07 PROCEDURE — 96374 THER/PROPH/DIAG INJ IV PUSH: CPT

## 2018-06-07 PROCEDURE — 36415 COLL VENOUS BLD VENIPUNCTURE: CPT | Performed by: HOSPITALIST

## 2018-06-07 PROCEDURE — 99218 HC RM OBSERVATION: CPT

## 2018-06-07 PROCEDURE — G8997 SWALLOW GOAL STATUS: HCPCS

## 2018-06-07 PROCEDURE — G8980 MOBILITY D/C STATUS: HCPCS

## 2018-06-07 PROCEDURE — G8996 SWALLOW CURRENT STATUS: HCPCS

## 2018-06-07 RX ORDER — SODIUM CHLORIDE 9 MG/ML
INJECTION INTRAMUSCULAR; INTRAVENOUS; SUBCUTANEOUS
Status: DISCONTINUED
Start: 2018-06-07 | End: 2018-06-07 | Stop reason: HOSPADM

## 2018-06-07 RX ORDER — ACETAMINOPHEN 500 MG
500 TABLET ORAL
Qty: 16 TAB | Refills: 0 | Status: SHIPPED | OUTPATIENT
Start: 2018-06-07

## 2018-06-07 RX ORDER — SODIUM CHLORIDE 9 MG/ML
15 INJECTION INTRAMUSCULAR; INTRAVENOUS; SUBCUTANEOUS ONCE
Status: COMPLETED | OUTPATIENT
Start: 2018-06-07 | End: 2018-06-07

## 2018-06-07 RX ADMIN — SODIUM CHLORIDE 15 ML: 9 INJECTION, SOLUTION INTRAMUSCULAR; INTRAVENOUS; SUBCUTANEOUS at 10:26

## 2018-06-07 RX ADMIN — DICYCLOMINE HYDROCHLORIDE 10 MG: 10 CAPSULE ORAL at 11:09

## 2018-06-07 RX ADMIN — ASPIRIN 81 MG: 81 TABLET, CHEWABLE ORAL at 11:09

## 2018-06-07 RX ADMIN — BUPROPION HYDROCHLORIDE 150 MG: 150 TABLET, EXTENDED RELEASE ORAL at 11:09

## 2018-06-07 RX ADMIN — SERTRALINE HYDROCHLORIDE 100 MG: 50 TABLET ORAL at 11:09

## 2018-06-07 RX ADMIN — ACETAMINOPHEN 650 MG: 325 TABLET ORAL at 03:57

## 2018-06-07 RX ADMIN — HEPARIN SODIUM 5000 UNITS: 5000 INJECTION, SOLUTION INTRAVENOUS; SUBCUTANEOUS at 03:57

## 2018-06-07 RX ADMIN — Medication 10 ML: at 06:00

## 2018-06-07 RX ADMIN — ACETAMINOPHEN 650 MG: 325 TABLET ORAL at 11:09

## 2018-06-07 RX ADMIN — FAMOTIDINE 20 MG: 20 TABLET, FILM COATED ORAL at 11:09

## 2018-06-07 NOTE — PROGRESS NOTES
Chart reviewed. Pt admitted observation status for TIA. PT/OT/Speech eval orders noted. CM will follow for discharge planning needs. 1120  Met with patient at bedside. Per Matias ZUÑIGA, pt will dc home today. Per pt, she has been ambulating without difficulty. Pt states she is independent and denies using any DME. Denies currently using any [place] services. Per Matias ZUÑIGA, pt will need neuro follow up. He was made aware MD García's office will contact pt for appt. Kurtis James will assist in making appt for PCP follow up. Pt states a friend/family will drive her home at discharge. Reason for Admission:   Per chart, pt is a 79-year-old female with a history of anxiety and depression came to the hospital with left-sided facial pain and numbness that radiated down her face and to her left arm about 4:00 today. She had associated pain and pressure behind her left eye, a frontal-type headache, and had never had any symptoms like this before. When she came into the hospital they called a code S. She was evaluated by the teleneurologist, as well as the ER physician. They did a CT of her head which was negative for acute stroke, and recommendation was to admit the patient for observation, perform an MRI and MRA of the head and neck, and to evaluate further for possible TIA. Her PCP is nurse practitioner, Maliha William. RRAT Score:     8, low                 Plan for utilizing home health:      TBD, awaiting PT/OT eval                    Likelihood of Readmission:   Moderate, considering TIA dx                         Transition of Care Plan:              TBD          Care Management Interventions  Transition of Care Consult (CM Consult): Discharge Planning  Physical Therapy Consult: Yes  Occupational Therapy Consult: Yes  Speech Therapy Consult:  Yes

## 2018-06-07 NOTE — ROUTINE PROCESS
TRANSFER - OUT REPORT:    Verbal report given to Jackie Priest RN(name) on Virgie Rose  being transferred to Tele(unit) for routine progression of care       Report consisted of patients Situation, Background, Assessment and   Recommendations(SBAR). Information from the following report(s) SBAR was reviewed with the receiving nurse. Lines:   Peripheral IV 06/06/18 Right Antecubital (Active)   Site Assessment Clean, dry, & intact 6/6/2018  6:17 PM   Phlebitis Assessment 0 6/6/2018  6:17 PM   Infiltration Assessment 0 6/6/2018  6:17 PM   Dressing Status Clean, dry, & intact 6/6/2018  6:17 PM   Dressing Type Transparent 6/6/2018  6:17 PM        Opportunity for questions and clarification was provided.       Patient transported with:   Monitor  Registered Nurse

## 2018-06-07 NOTE — PROGRESS NOTES
Problem: Self Care Deficits Care Plan (Adult)  Goal: *Acute Goals and Plan of Care (Insert Text)  Occupational Therapy EVALUATION/discharge    Patient: Esperanza Izaguirre (42 y.o. female)  Date: 6/7/2018  Primary Diagnosis: TIA (transient ischemic attack)  TIA (transient ischemic attack)        Precautions:  Fall    ASSESSMENT AND RECOMMENDATIONS:  Based on the objective data described below, the patient presents with left sided facial numbness and left UE numbness. Pt does not have any residual left UE weakness at this time. Pt completed transfers and functional mobility independently. Pt completed UB and LB ADL independently. No coordination deficits. Pt reported pressure in left eye still and headache. No further skilled acute OT needs indicated at this time. Will sign off. Skilled occupational therapy is not indicated at this time. Discharge Recommendations: None  Further Equipment Recommendations for Discharge: N/A      SUBJECTIVE:   Patient stated I'm doing fine now.     OBJECTIVE DATA SUMMARY:     Past Medical History:   Diagnosis Date    Arthropathy     GERD (gastroesophageal reflux disease)     IBS (irritable bowel syndrome)     Mental disorder     Psychiatric disorder     PTSD (post-traumatic stress disorder)     UTI (lower urinary tract infection)      Past Surgical History:   Procedure Laterality Date    CYSTOURETHROSCOPY  7/1/2015         HX CARPAL TUNNEL RELEASE      HX HERNIA REPAIR      HX HYSTERECTOMY      HX KNEE ARTHROSCOPY      Right     Barriers to Learning/Limitations: None  Compensate with: visual, verbal, tactile, kinesthetic cues/model  Prior Level of Function/Home Situation: I with ADLs prior to admission  Home Situation  Home Environment: Private residence  # Steps to Enter: 3  Rails to Enter: Yes  Hand Rails : Bilateral  One/Two Story Residence: Two story  # of Interior Steps: 10  Interior Rails: Both  Living Alone: No  Support Systems: Family member(s)  Patient Expects to be Discharged to[de-identified] Private residence  Current DME Used/Available at Home: None  Tub or Shower Type: Tub/Shower combination  []     Right hand dominant   []     Left hand dominant  Cognitive/Behavioral Status:  Neurologic State: Alert; Appropriate for age  Orientation Level: Oriented X4  Cognition: Appropriate decision making; Appropriate for age attention/concentration; Appropriate safety awareness; Follows commands  Safety/Judgement: Fall prevention;Good awareness of safety precautions  Skin: intact  Edema: none noted  Vision/Perceptual:  N/A  Coordination:  Coordination: Within functional limits  Fine Motor Skills-Upper: Left Intact; Right Intact    Gross Motor Skills-Upper: Left Intact; Right Intact  Balance:  Sitting: Intact  Standing: Intact; Without support  Strength:  Strength: Within functional limits  Tone & Sensation:  Tone: Normal  Sensation: Intact  Range of Motion:  AROM: Within functional limits  Functional Mobility and Transfers for ADLs:  Bed Mobility:  Supine to Sit: Independent  Sit to Supine: Independent  Transfers:  Sit to Stand: Independent  ADL Assessment:  Upper Body Dressing: Independent  Lower Body Dressing: Independent  ADL Intervention:  Cognitive Retraining  Safety/Judgement: Fall prevention;Good awareness of safety precautions    Pain:  Pain Scale 1: Numeric (0 - 10)  Pain Intensity 1: 0  Pain Location 1: Eye  Pain Orientation 1: Left  Pain Description 1: Pressure  Pain Intervention(s) 1: Medication (see MAR)  Activity Tolerance:   good  Please refer to the flowsheet for vital signs taken during this treatment.   After treatment:   []  Patient left in no apparent distress sitting up in chair  [x]  Patient left in no apparent distress in bed  [x]  Call bell left within reach  []  Nursing notified  []  Caregiver present  []  Bed alarm activated    COMMUNICATION/EDUCATION:   Communication/Collaboration:  [x]      Home safety education was provided and the patient/caregiver indicated understanding. [x]      Patient/family have participated as able and agree with findings and recommendations. []      Patient is unable to participate in plan of care at this time. Damon Rubio, OTR/L  Time Calculation: 10 mins     Carry  Current  CH= 0%    Goal  CH= 0%   D/C  CH= 0%. The severity rating is based on the Level of Assistance required for Functional Mobility and ADLs.

## 2018-06-07 NOTE — PROGRESS NOTES
Problem: Falls - Risk of  Goal: *Absence of Falls  Document Lenny Fall Risk and appropriate interventions in the flowsheet.    Outcome: Progressing Towards Goal  Fall Risk Interventions:            Medication Interventions: Patient to call before getting OOB, Teach patient to arise slowly

## 2018-06-07 NOTE — PROGRESS NOTES
Problem: Falls - Risk of  Goal: *Absence of Falls  Document Lenny Fall Risk and appropriate interventions in the flowsheet.   Outcome: Progressing Towards Goal  Fall Risk Interventions:

## 2018-06-07 NOTE — DISCHARGE SUMMARY
Discharge Summary    Patient: Dwyane Goodpasture MRN: [id number]  CSN: [id number]    YOB: 1980  Age: 43 y.o. Sex: female    DOA: 6/6/2018 LOS:  LOS: 0 days   Discharge Date:      Primary Care Provider:  Sami Strange NP    Admission Diagnoses: TIA (transient ischemic attack)  TIA (transient ischemic attack)    Discharge Diagnoses:    Problem List as of 6/7/2018  Date Reviewed: 7/1/2015          Codes Class Noted - Resolved    * (Principal)TIA (transient ischemic attack) ICD-10-CM: G45.9  ICD-9-CM: 435.9  6/6/2018 - Present        Complicated migraine CWF-44-JR: G43.109  ICD-9-CM: 346.00  6/6/2018 - Present        Bladder endometriosis ICD-10-CM: N80.8  ICD-9-CM: 617.8  7/1/2015 - Present              Discharge Medications:     Current Discharge Medication List      CONTINUE these medications which have CHANGED    Details   acetaminophen (TYLENOL) 500 mg tablet Take 1 Tab by mouth every four (4) hours as needed for Pain. Qty: 16 Tab, Refills: 0         CONTINUE these medications which have NOT CHANGED    Details   MULTIVIT WITH CALCIUM,IRON,MIN (WOMEN'S DAILY PO) Take  by mouth daily. buPROPion SR (WELLBUTRIN SR) 150 mg SR tablet Take  by mouth two (2) times a day. ergocalciferol (VITAMIN D2) 50,000 unit capsule Take 50,000 Units by mouth.      esomeprazole (NEXIUM) 40 mg capsule Take  by mouth daily. fexofenadine (ALLEGRA) 180 mg tablet Take  by mouth daily. sertraline (ZOLOFT) 100 mg tablet Take  by mouth daily. traZODone (DESYREL) 100 mg tablet Take 100 mg by mouth nightly. dicyclomine (BENTYL) 10 mg capsule Take 10 mg by mouth 4 times daily (before meals and nightly).          STOP taking these medications       oxyCODONE-acetaminophen (PERCOCET) 5-325 mg per tablet Comments:   Reason for Stopping:         ondansetron (ZOFRAN ODT) 4 mg disintegrating tablet Comments:   Reason for Stopping:         CYCLOBENZAPRINE HCL (FLEXERIL PO) Comments:   Reason for Stopping: HYDROcodone-acetaminophen (NORCO) 5-325 mg per tablet Comments:   Reason for Stopping:               Discharge Condition: Stable    Procedures : None    Consults: Urology      PHYSICAL EXAM    Visit Vitals    BP 99/69    Pulse 74    Temp 98 °F (36.7 °C)    Resp 17    Ht 5' 7\" (1.702 m)    Wt 95.3 kg (210 lb)    SpO2 100%    Breastfeeding No    BMI 32.89 kg/m2     General: Awake, cooperative, no acute distress    HEENT: NC, Atraumatic. PERRLA, EOMI. Anicteric sclerae. Lungs:  CTA Bilaterally. No Wheezing/Rhonchi/Rales. Heart:  Regular  rhythm,  No murmur, No Rubs, No Gallops  Abdomen: Soft, Non distended, Non tender. +Bowel sounds,   Extremities: No c/c/e  Psych:   Not anxious or agitated. Neurologic:  No acute neurological deficits. Admission HPI : This 74-year-old female with a history of anxiety and depression came to the hospital with left-sided facial pain and numbness that radiated down her face and to her left arm about 4:00 today. She had associated pain and pressure behind her left eye, a frontal-type headache, and had never had any symptoms like this before. When she came into the hospital they called a code S. She was evaluated by the teleneurologist, as well as the ER physician. They did a CT of her head which was negative for acute stroke, and recommendation was to admit the patient for observation, perform an MRI and MRA of the head and neck, and to evaluate further for possible TIA. Her PCP is nurse practitioner, Wilmer Guo. Hospital Course : This patient was admitted to medical services on June 6, 2018 for symptoms concerning for TIA. She was evaluated by teleneurology in the ED and they recommended admission for observation and workup. For her TIA symptoms, she underwent a CT head which was unremarkable.  She then underwent MRI brain without contrast, MRA brain without contrast, and MRA neck with contrast - all of which returned unremarkable. She was seen by neurology services here while hospitalized, and Dr Dawna Mac recommended no further workup at this time. She may resume her usual home medications with Tylenol 500mg every 4 hours as needed for headache. At the time of discharge she was complaining of a residual headache, but no other symptoms at this time. She will be discharged home today, and may resume her usual medications. She was instructed to follow up with her PCP upon discharge for routine hospital follow up. She was also advised that Dr Dawna Mac will see her in the clinic, and case management was notified of the need for this appointment. Activity: Activity as tolerated    Diet: Regular Diet    Follow-up: PCP; Neurology    Disposition: Home    Minutes spent on discharge: 28       Labs: Results:       Chemistry Recent Labs      06/07/18   0258  06/06/18   1800   GLU  117*  101*   NA  139  138   K  4.1  3.8   CL  105  103   CO2  27  24   BUN  9  11   CREA  0.90  0.93   CA  8.5  8.9   AGAP  7  11   BUCR  10*  12   AP   --   107   TP   --   8.7*   ALB   --   3.2*   GLOB   --   5.5*   AGRAT   --   0.6*      CBC w/Diff Recent Labs      06/07/18   0258  06/06/18   1800   WBC  7.6  7.4   RBC  3.82*  4.01*   HGB  10.8*  11.5*   HCT  33.2*  34.4*   PLT  344  356   GRANS   --   55   LYMPH   --   37   EOS   --   2      Cardiac Enzymes Recent Labs      06/06/18   1800   CPK  78   CKND1  CALCULATION NOT PERFORMED WHEN RESULT IS BELOW LINEAR LIMIT      Coagulation Recent Labs      06/06/18   1800   PTP  12.7   INR  1.0   APTT  27.8       Lipid Panel Lab Results   Component Value Date/Time    Cholesterol, total 153 06/07/2018 02:58 AM    HDL Cholesterol 40 06/07/2018 02:58 AM    LDL, calculated 93.8 06/07/2018 02:58 AM    VLDL, calculated 19.2 06/07/2018 02:58 AM    Triglyceride 96 06/07/2018 02:58 AM    CHOL/HDL Ratio 3.8 06/07/2018 02:58 AM      BNP No results for input(s): BNPP in the last 72 hours.    Liver Enzymes Recent Labs 06/06/18   1800   TP  8.7*   ALB  3.2*   AP  107   SGOT  28      Thyroid Studies No results found for: T4, T3U, TSH, TSHEXT         Significant Diagnostic Studies: Mra Brain Wo Cont    Result Date: 6/7/2018  MR angiogram of the [place] without contrast 6/6/2018. INDICATION:   Left face and ear numbness. COMPARISON:  [None]. TECHNIQUE:  3-D time-of-flight MRA of the brain was performed. MIP reconstructions were obtained. FINDINGS:  Evaluation of intracranial vasculature is within normal limits. Specifically, there is no evidence of vascular stenosis or occlusion. There are no findings to suggest aneurysm formation or underlying vascular malformation. Left vertebral artery dominance is noted. IMPRESSION: 1. Unremarkable MR angiogram of the intracranial vasculature. Mra Neck W Cont    Result Date: 6/7/2018  MRA of the neck with contrast 6/6/2018. History: Left-sided facial numbness. Technique:3-D postcontrast MRA of the cervical vasculature were performed. MIP reconstructions were obtained. No prior studies are available for comparison. Findings: The origins of the great vessels from the aortic arch and the origin of the right common carotid artery from the innominate artery appear widely patent. The common carotid arteries are widely patent. The carotid bifurcations are clear. The cervical portions of both internal carotid arteries appear normal. Specifically, there are no areas of vascular stenosis or occlusion. Left vertebral artery dominance is noted. Both vertebral artery origins are patent. The visualized portions of the vertebral arteries are unremarkable. Specifically, there is no evidence of vascular stenosis or occlusion. The vertebrobasilar junction is intact. Impression: 1. Unremarkable MRA of the cervical vasculature. Mri Brain Wo Cont    Result Date: 6/7/2018  EXAM: MRI brain without contrast 6/6/2018 INDICATION: Left face and ear numbness. Pressure behind the left eye. COMPARISON: CT scan of the head from 6/6/2018. TECHNIQUE: Multiplanar multisequential images of the brain were obtained without contrast. _______________ FINDINGS: BRAIN AND POSTERIOR FOSSA: The sulci, folia, ventricles and basal cisterns are within normal limits for the patient?s age. There is no intracranial hemorrhage, mass effect, or midline shift. There are no areas of abnormal parenchymal signal. There are no areas of restricted diffusion to suggest acute infarct. EXTRA-AXIAL SPACES AND MENINGES: There are no abnormal extra-axial fluid collections. VASCULAR: The visualized portions of the intracranial carotid and vertebrobasilar systems demonstrate patent appearing flow voids. CALVARIA: Normal. SINUSES: Clear. CRANIOCERVICAL JUNCTION: Normal. OTHER: None. _______________     IMPRESSION: Unremarkable MRI of the brain without contrast.     Ct Head Wo Cont    Result Date: 6/6/2018  EXAM: CT head INDICATION: Left facial weakness, confusion, headache. COMPARISON: None. TECHNIQUE: Axial CT imaging of the head was performed without intravenous contrast. One or more dose reduction techniques were used on this CT: automated exposure control, adjustment of the mAs and/or kVp according to patient's size, and iterative reconstruction techniques. The specific techniques utilized on this CT exam have been documented in the patient's electronic medical record. _______________ FINDINGS: BRAIN AND EXTRA-AXIAL SPACES: There is no intracranial hemorrhage, mass effect, or midline shift. There are no areas of abnormal parenchymal attenuation. There are no abnormal extra-axial fluid collections. CALVARIUM: Intact. SINUSES: Clear. OTHER: None. _______________     IMPRESSION: 1. No acute intracranial abnormalities. These findings were discussed with Dr. Leland Horn at [facility] on 6/6/2018.      Xr Chest Port    Result Date: 6/7/2018  --------------------------------------------------------------------------- <<<<<<<<<           [facility]           >>>>>>>>> --------------------------------------------------------------------------- CLINICAL HISTORY:  Chest pain. Shortness of breath. COMPARISON EXAMINATIONS:  None. ---  SINGLE FRONTAL VIEW OF THE CHEST  --- The lungs and pleural spaces are clear. The mediastinum is unremarkable in appearance. No significant osseous abnormalities are identified.  --------------    Impression: -------------- No active pulmonary disease. No results found for this or any previous visit.         CC: Dawn Salvador, NP

## 2018-06-07 NOTE — PROGRESS NOTES
Problem: Dysphagia (Adult)  Goal: *Acute Goals and Plan of Care (Insert Text)  Dysphagia Present:   No    Recommendations:  Diet: Regular/thin  Meds: Per patient preference     Patient will:  1. Participate in training and education related to continued aspiration risk, diet recs and compensatory strategies (goal met). Outcome: Resolved/Met Date Met: 06/07/18  Speech LAnguage Pathology bedside swallow evaluation AND DISCHARGE    Patient: Kayla Ward (59 y.o. female)  Date: 6/7/2018  Primary Diagnosis: TIA (transient ischemic attack)  TIA (transient ischemic attack)        Precautions: None     PLOF: Independent  ASSESSMENT :  Clinical beside swallow eval completed per MD orders. Pt A&Ox4. Functional communication. Intelligibility >90%. Cognitive-linguistic function appears intact. OM examination revealed oral motor structures functional for mastication and deglutition. Pt reported history of reflux. Presented with thin liquid, puree, and solid trials. Exhibited + bolus cohesion, manipulation and A-P transit. Further exhibited + swallow timing/reflex and hyolaryngeal excursion. Pt able to manipulate and clear with 0 clinical s/s aspiration and/or oropharyngeal dysphagia. Pt safe for regular solid, thin liquid diet. 0 formal ST needs for dysphagia indicated at this time. SLP educated pt on role of speech therapist in current setting with re: speech/swallow, reflux precautions; verbalized comprehension. SLP available for re-evaluation if indicated by MD. Results d/w RN, [place]. Thank you for this referral.   Barbara Bernabe, SLP     PLAN :  Recommendations and Planned Interventions:  No formal ST needs ID'd for dysphagia. Eval only. Discharge Recommendations: None     SUBJECTIVE:   Patient stated I don't eat right before bed and I don't eat spicy food or a lot of marinara.     OBJECTIVE:     Past Medical History:   Diagnosis Date    Arthropathy     GERD (gastroesophageal reflux disease)     IBS (irritable bowel syndrome)     Mental disorder     Psychiatric disorder     PTSD (post-traumatic stress disorder)     UTI (lower urinary tract infection)      Past Surgical History:   Procedure Laterality Date    CYSTOURETHROSCOPY  7/1/2015         HX CARPAL TUNNEL RELEASE      HX HERNIA REPAIR      HX HYSTERECTOMY      HX KNEE ARTHROSCOPY      Right     Prior Level of Function/Home Situation: Independent  Home Situation  Home Environment: Private residence  One/Two Story Residence: Two story  Living Alone: No  Support Systems: Family member(s)  Patient Expects to be Discharged to[de-identified] Private residence  Current DME Used/Available at Home: None  Diet prior to admission: Regular/thin  Current Diet:  Regular/thin   Cognitive and Communication Status:  Neurologic State: Alert  Orientation Level: Oriented X4  Cognition: Appropriate decision making, Appropriate for age attention/concentration, Appropriate safety awareness, Follows commands  Perception: Appears intact  Perseveration: No perseveration noted  Safety/Judgement: Awareness of environment, Good awareness of safety precautions, Insight into deficits  Oral Assessment:  Oral Assessment  Labial: No impairment  Dentition: Natural;Intact  Oral Hygiene: Good  Lingual: No impairment  Velum: No impairment  Mandible: No impairment  P.O. Trials:  Patient Position: EOB 90  Vocal quality prior to P.O.: No impairment  Consistency Presented: Thin liquid; Solid;Puree  How Presented: Self-fed/presented;Straw     Bolus Acceptance: No impairment  Bolus Formation/Control: No impairment     Propulsion: No impairment  Oral Residue: None  Initiation of Swallow: No impairment  Laryngeal Elevation: Functional  Aspiration Signs/Symptoms: None  Pharyngeal Phase Characteristics: No impairment, issues, or problems   Effective Modifications: None  Cues for Modifications: None       Oral Phase Severity: No impairment  Pharyngeal Phase Severity : No impairment    [facility]:  Swallow Current Status CH= 0%   Swallow Goal Status CH= 0%   Swallow D/C Status CH= 0%    The severity rating is based on the following outcomes:  ISIAH Noms Swallow Level 7    Clinical Judgement      PAIN:  Start of Eval: 0  End of Eval: 0     After evaluation:   []            Patient left in no apparent distress sitting up in chair  [x]            Patient left in no apparent distress in bed  [x]            Call bell left within reach  [x]            Nursing notified  []            Family present  []            Caregiver present  []            Bed alarm activated      COMMUNICATION/EDUCATION:   [x]            Swallow safety; compensatory techniques. [x]            Patient/family have participated as able in goal setting and plan of care. [x]            Patient/family agree to work toward stated goals and plan of care. []            Patient understands intent and goals of therapy; neutral about participation. []            Patient unable to participate in goal setting/plan of care; educ ongoing with interdisciplinary staff  []         Posted safety precautions in patient's room.     Thank you for this referral.  EDMUNDO Samano  Time Calculation: 12 mins

## 2018-06-07 NOTE — H&P
[facility]  HISTORY AND PHYSICAL      Rozina BIRMINGHAM  MR#: [id number]  : 1980  ACCOUNT #: [de-identified]   ADMIT DATE: 2018    ADMITTING DIAGNOSES:  1. Possible transient ischemic attack. 2.  Migraine headache and left eye pain and pressure. HISTORY OF PRESENT ILLNESS:  This 26-year-old female with a history of anxiety and depression came to the hospital with left-sided facial pain and numbness that radiated down her face and to her left arm about 4:00 today. She had associated pain and pressure behind her left eye, a frontal-type headache, and had never had any symptoms like this before. When she came into the hospital they called a code S. She was evaluated by the teleneurologist, as well as the ER physician. They did a CT of her head which was negative for acute stroke, and recommendation was to admit the patient for observation, perform an MRI and MRA of the head and neck, and to evaluate further for possible TIA. Her PCP is nurse practitioner, Irving Calhoun. PAST MEDICAL HISTORY:  Notable for anxiety, GERD, PTSD, irritable bowel syndrome, and arthropathy. PAST SURGICAL HISTORY:  Cystourethroscopy, carpal tunnel release, hernia repair, hysterectomy, right knee surgery. FAMILY HISTORY:  There is no history of strokes or neurologic illness. SOCIAL HISTORY:  Nonsmoker. Does not drink alcohol regularly. Denies drug use. ALLERGIES:  SHELLFISH AND PREDNISONE. REVIEW OF SYSTEMS:    NEUROLOGIC:  She had paresthesias and numbness down her left arm and face, which is now better. The arm is back to normal, the face is feeling better, almost back to normal.  It started at 4:00 p.m. this afternoon. She had no symptoms in her legs. No paresthesias, or numbness, or weakness in her legs. No syncopal symptoms, no seizure-like activity. No blindness. Also, she feels like the vision in the left eye was blurred due to the pressure behind the left eye.   She had associated headache as well. CARDIOVASCULAR:  No chest pain, palpitations, or leg swelling. RESPIRATORY:  No shortness of breath, wheezing, or cough. GASTROINTESTINAL:  No nausea, vomiting, diarrhea. HEMATOLOGIC:  No bleeding or bruisability. ENDOCRINE:  No swollen glands. MEDICATIONS:  At home include Zoloft, trazodone, Percocet, Allegra, Nexium, Bentyl, Flexeril, bupropion. PHYSICAL EXAMINATION:  GENERAL:  This is a 41-year-old -American female who is a little anxious, but she is oriented x3, in no acute distress. She is cooperative. VITAL SIGNS:  Temp is 98.1, pulse 79, blood pressure 132/94, respiratory rate is 16, SaO2 is 100% on room air. HEENT:  Sclerae anicteric. Conjunctivae are pink. Pupils equal, round, and reactive to light. No nystagmus. Oropharynx is dry, no lesions. NECK:  Supple. No thyromegaly or lymphadenopathy. No carotid bruits. LUNGS:  Clear bilaterally. No rales, rhonchi, or wheezes. HEART:  Regular rate and rhythm. No murmur, rub, or gallop. ABDOMEN:  Soft, nontender, no distention. Normal bowel sounds. EXTREMITIES:  No clubbing, cyanosis, or edema. Distal pulses are palpable. NEUROLOGIC:  Shows cranial nerves II-XII grossly intact. Her tongue is midline. Upper extremity strength is equal bilaterally. No muscle fasciculations. Lower extremity strength equal bilaterally. Distal tendon reflexes difficult to elicit due to her tense status currently with anxiety, but no focal deficits are noted per my exam.    LABORATORY DATA:  Show a chest x-ray that is unremarkable. MRI and MRA have been performed, but no results are available at this point in time. EKG showed normal sinus rhythm. She had a CT scan of her head that showed no acute intracranial abnormalities. Her CBC was essentially normal.  Hemoglobin slightly depressed at 11.5. Urinalysis showed no nitrites or leukocyte esterase. Her chemistry was essentially normal.  LFTs were as well. Cardiac markers also within normal range. Urine drug screen was negative. Blood glucose was 98. ASSESSMENT:  Possible transient ischemic attack versus complex migraine. PLAN:  Admit to the hospital.  Echocardiogram, MRI of the brain, MRA of the head and neck. No acute stroke has been seen at this point in time, so continue with daily aspirin. She did receive a loading dose in the ER already; will continue. The plan is for a statin at night, Pepcid twice daily, heparin for DVT prophylaxis, and IV fluids tonight. Will hold any blood pressure medications. She can resume her psychiatric meds she takes at home including Zoloft, and her Bentyl for irritable bowel. Follow up tomorrow morning:  A CBC, metabolic profile, hemoglobin A1c, and a lipid panel. Consult with the inpatient neurologist.  Treat her migraine headache with Toradol if necessary. Otherwise, Tylenol if it is mild, and Zofran as needed for nausea. I anticipate a 24-hour stay for the patient if she is doing well and her workup is unremarkable by tomorrow.       MD BIJAN Benjamin/BEL  D: 06/06/2018 20:37     T: 06/06/2018 21:19  JOB #: [id number]

## 2018-06-07 NOTE — DISCHARGE INSTRUCTIONS
Migraine Headache: Care Instructions  Your Care Instructions  Migraines are painful, throbbing headaches that often start on one side of the head. They may cause nausea and vomiting and make you sensitive to light, sound, or smell. Without treatment, migraines can last from 4 hours to a few days. Medicines can help prevent migraines or stop them after they have started. Your doctor can help you find which ones work best for you. Follow-up care is a key part of your treatment and safety. Be sure to make and go to all appointments, and call your doctor if you are having problems. It's also a good idea to know your test results and keep a list of the medicines you take. How can you care for yourself at home? · Do not drive if you have taken a prescription pain medicine. · Rest in a quiet, dark room until your headache is gone. Close your eyes, and try to relax or go to sleep. Don't watch TV or read. · Put a cold, moist cloth or cold pack on the painful area for 10 to 20 minutes at a time. Put a thin cloth between the cold pack and your skin. · Use a warm, moist towel or a heating pad set on low to relax tight shoulder and neck muscles. · Have someone gently massage your neck and shoulders. · Take your medicines exactly as prescribed. Call your doctor if you think you are having a problem with your medicine. You will get more details on the specific medicines your doctor prescribes. · Be careful not to take pain medicine more often than the instructions allow. You could get worse or more frequent headaches when the medicine wears off. To prevent migraines  · Keep a headache diary so you can figure out what triggers your headaches. Avoiding triggers may help you prevent headaches. Record when each headache began, how long it lasted, and what the pain was like.  (Was it throbbing, aching, stabbing, or dull?) Write down any other symptoms you had with the headache, such as nausea, flashing lights or dark spots, or sensitivity to bright light or loud noise. Note if the headache occurred near your period. List anything that might have triggered the headache. Triggers may include certain foods (chocolate, cheese, wine) or odors, smoke, bright light, stress, or lack of sleep. · If your doctor has prescribed medicine for your migraines, take it as directed. You may have medicine that you take only when you get a migraine and medicine that you take all the time to help prevent migraines. ¨ If your doctor has prescribed medicine for when you get a headache, take it at the first sign of a migraine, unless your doctor has given you other instructions. ¨ If your doctor has prescribed medicine to prevent migraines, take it exactly as prescribed. Call your doctor if you think you are having a problem with your medicine. · Find healthy ways to deal with stress. Migraines are most common during or right after stressful times. Take time to relax before and after you do something that has caused a migraine in the past.  · Try to keep your muscles relaxed by keeping good posture. Check your jaw, face, neck, and shoulder muscles for tension. Try to relax them. When you sit at a desk, change positions often. And make sure to stretch for 30 seconds each hour. · Get plenty of sleep and exercise. · Eat meals on a regular schedule. Avoid foods and drinks that often trigger migraines. These include chocolate, alcohol (especially red wine and port), aspartame, monosodium glutamate (MSG), and some additives found in foods (such as hot dogs, cohn, cold cuts, aged cheeses, and pickled foods). · Limit caffeine. Don't drink too much coffee, tea, or soda. But don't quit caffeine suddenly. That can also give you migraines. · Do not smoke or allow others to smoke around you. If you need help quitting, talk to your doctor about stop-smoking programs and medicines. These can increase your chances of quitting for good.   · If you are taking birth control pills or hormone therapy, talk to your doctor about whether they are triggering your migraines. When should you call for help? Call 911 anytime you think you may need emergency care. For example, call if:  ? · You have signs of a stroke. These may include:  ¨ Sudden numbness, paralysis, or weakness in your face, arm, or leg, especially on only one side of your body. ¨ Sudden vision changes. ¨ Sudden trouble speaking. ¨ Sudden confusion or trouble understanding simple statements. ¨ Sudden problems with walking or balance. ¨ A sudden, severe headache that is different from past headaches. ?Call your doctor now or seek immediate medical care if:  ? · You have new or worse nausea and vomiting. ? · You have a new or higher fever. ? · Your headache gets much worse. ? Watch closely for changes in your health, and be sure to contact your doctor if:  ? · You are not getting better after 2 days (48 hours). Where can you learn more? Go to http://zuhair-tin.info/. Enter Z899 in the search box to learn more about \"Migraine Headache: Care Instructions. \"  Current as of: October 14, 2016  Content Version: 11.4  © 6722-9231 [employer]. Care instructions adapted under license by [employer] (which disclaims liability or warranty for this information). If you have questions about a medical condition or this instruction, always ask your healthcare professional. [place] [address] any warranty or liability for your use of this information.   Patient armband removed and shredded  DISCHARGE SUMMARY from Nurse    PATIENT INSTRUCTIONS:    After general anesthesia or intravenous sedation, for 24 hours or while taking prescription Narcotics:  · Limit your activities  · Do not drive and operate hazardous machinery  · Do not make important personal or business decisions  · Do  not drink alcoholic beverages  · If you have not urinated within 8 hours after discharge, please contact your surgeon on call. Report the following to your surgeon:  · Excessive pain, swelling, redness or odor of or around the surgical area  · Temperature over 100.5  · Nausea and vomiting lasting longer than 4 hours or if unable to take medications  · Any signs of decreased circulation or nerve impairment to extremity: change in color, persistent  numbness, tingling, coldness or increase pain  · Any questions    What to do at Home:  Recommended activity: Activity as tolerated    If you experience any of the following symptoms heartburn not relieved with over the counter medications or headache not relieved with rest or medication, please follow up with primary care provider. *  Please give a list of your current medications to your Primary Care Provider. *  Please update this list whenever your medications are discontinued, doses are      changed, or new medications (including over-the-counter products) are added. *  Please carry medication information at all times in case of emergency situations. These are general instructions for a healthy lifestyle:    No smoking/ No tobacco products/ Avoid exposure to second hand smoke  Surgeon General's Warning:  Quitting smoking now greatly reduces serious risk to your health. Obesity, smoking, and sedentary lifestyle greatly increases your risk for illness    A healthy diet, regular physical exercise & weight monitoring are important for maintaining a healthy lifestyle    You may be retaining fluid if you have a history of heart failure or if you experience any of the following symptoms:  Weight gain of 3 pounds or more overnight or 5 pounds in a week, increased swelling in our hands or feet or shortness of breath while lying flat in bed. Please call your doctor as soon as you notice any of these symptoms; do not wait until your next office visit.     Recognize signs and symptoms of STROKE:    F-face looks uneven    A-arms unable to move or move unevenly    S-speech slurred or non-existent    T-time-call 911 as soon as signs and symptoms begin-DO NOT go       Back to bed or wait to see if you get better-TIME IS BRAIN. Warning Signs of HEART ATTACK     Call 911 if you have these symptoms:   Chest discomfort. Most heart attacks involve discomfort in the center of the chest that lasts more than a few minutes, or that goes away and comes back. It can feel like uncomfortable pressure, squeezing, fullness, or pain.  Discomfort in other areas of the upper body. Symptoms can include pain or discomfort in one or both arms, the back, neck, jaw, or stomach.  Shortness of breath with or without chest discomfort.  Other signs may include breaking out in a cold sweat, nausea, or lightheadedness. Don't wait more than five minutes to call 911 - MINUTES MATTER! Fast action can save your life. Calling 911 is almost always the fastest way to get lifesaving treatment. Emergency Medical Services staff can begin treatment when they arrive -- up to an hour sooner than if someone gets to the hospital by car. The discharge information has been reviewed with the patient. The patient verbalized understanding. Discharge medications reviewed with the patient and appropriate educational materials and side effects teaching were provided.   ___________________________________________________________________________________________________________________________________

## 2018-06-07 NOTE — PROGRESS NOTES
Problem: Mobility Impaired (Adult and Pediatric)  Goal: *Acute Goals and Plan of Care (Insert Text)  physical Therapy EVALUATION & Discharge    Patient: Raymond Ng (34 y.o. female)  Date: 6/7/2018  Primary Diagnosis: TIA (transient ischemic attack)  TIA (transient ischemic attack)  Precautions:   Fall    ASSESSMENT AND RECOMMENDATIONS:  Based on the objective data described below, the patient presents with equal strength bilaterally, good static and dynamic balance, Jaime level with all mobility, stair negotiation with no issues, no LOB or unsteadiness at this time. Skilled physical therapy is not indicated at this time. Discharge Recommendations: None  Further Equipment Recommendations for Discharge: N/A      SUBJECTIVE:   Patient stated I feel 100% better, just this headache and I feel pressure behind my eye.     OBJECTIVE DATA SUMMARY:     Past Medical History:   Diagnosis Date    Arthropathy     GERD (gastroesophageal reflux disease)     IBS (irritable bowel syndrome)     Mental disorder     Psychiatric disorder     PTSD (post-traumatic stress disorder)     UTI (lower urinary tract infection)      Past Surgical History:   Procedure Laterality Date    CYSTOURETHROSCOPY  7/1/2015         HX CARPAL TUNNEL RELEASE      HX HERNIA REPAIR      HX HYSTERECTOMY      HX KNEE ARTHROSCOPY      Right     Barriers to Learning/Limitations: None  Compensate with: visual, verbal, tactile, kinesthetic cues/model  Prior Level of Function/Home Situation: Independent amb s/AD  Home Situation  Home Environment: Private residence  # Steps to Enter: 3  Rails to Enter: Yes  Hand Rails : Bilateral  One/Two Story Residence: Two story  # of Interior Steps: 10  Interior Rails: Both  Living Alone: No  Support Systems: Family member(s)  Patient Expects to be Discharged to[de-identified] Private residence  Current DME Used/Available at Home: None  Critical Behavior:  Neurologic State: Alert  Orientation Level: Oriented X4  Cognition: Appropriate decision making; Appropriate for age attention/concentration; Appropriate safety awareness; Follows commands  Safety/Judgement: Awareness of environment;Good awareness of safety precautions; Insight into deficits  Psychosocial  Purposeful Interaction: Yes  Pt Identified Daily Priority: Clinical issues (comment)  Caritas Process: Nurture loving kindness;Establish trust;Nurture spiritual self;Teaching/learning; Attend basic human needs;Create healing environment  Caring Interventions: Reassure; Therapeutic modalities  Reassure: Informing; Therapeutic listening; Acceptance;Caring rounds;Quiet presence  Therapeutic Modalities: Humor; Intentional therapeutic touch  Strength:    Strength: Within functional limits  Tone & Sensation:   Tone: Normal  Sensation: Intact  Range Of Motion:  AROM: Within functional limits  Functional Mobility:  Bed Mobility:  Supine to Sit: Independent  Sit to Supine: Independent  Transfers:  Sit to Stand: Independent  Stand to Sit: Independent  Balance:   Sitting: Intact  Standing: Intact; Without support  Ambulation/Gait Training:  Distance (ft): 350 Feet (ft)  Assistive Device: Gait belt  Ambulation - Level of Assistance: Modified independent  Gait Description (WDL): Exceptions to WDL  Gait Abnormalities: Decreased step clearance  Pain:  Pain Scale 1: Numeric (0 - 10)  Pain Intensity 1: 0  Pain Location 1: Eye  Pain Orientation 1: Left  Pain Description 1: Pressure  Pain Intervention(s) 1: Medication (see MAR)  Activity Tolerance:   Good  Please refer to the flowsheet for vital signs taken during this treatment.   After treatment:   []         Patient left in no apparent distress sitting up in chair  [x]         Patient left in no apparent distress in bed  [x]         Call bell left within reach  [x]         Nursing notified  []         Caregiver present  []         Bed alarm activated    COMMUNICATION/EDUCATION:   [x]         Fall prevention education was provided and the patient/caregiver indicated understanding. [x]         Patient/family have participated as able in goal setting and plan of care to discharge. []         Patient/family agree to work toward stated goals and plan of care. []         Patient understands intent and goals of therapy, but is neutral about his/her participation. []         Patient is unable to participate in goal setting and plan of care. Thank you for this referral.  [place] Yong French   Time Calculation: 10 mins  Eval Complexity: History: MEDIUM  Complexity : 1-2 comorbidities / personal factors will impact the outcome/ POC Exam:LOW Complexity : 1-2 Standardized tests and measures addressing body structure, function, activity limitation and / or participation in recreation  Presentation: LOW Complexity : Stable, uncomplicated  Clinical Decision Making:Low Complexity amb >30 ft s/AD, Jaime/I Overall Complexity:LOW  Mobility  Current  CI= 1-19%   Goal  CI= 1-19%  D/C  CI= 1-19%. The severity rating is based on the Level of Assistance required for Functional Mobility and ADLs.

## 2018-06-07 NOTE — PROGRESS NOTES
0730 Bedside report received from Chris Orellana RN. PT states she has pain and Ruthann RN states she will give her pain medication. 0925 Pt off floor for echo. 1045 Pt returned from echo.

## 2018-06-07 NOTE — CONSULTS
NEUROLOGY CONSULTATION NOTE    Patient: Beth Munoz MRN: [id number]  CSN: [id number]    YOB: 1980  Age: 43 y.o. Sex: female    DOA: 6/6/2018 LOS:  LOS: 0 days        Requesting Physician: Dr. Conrad Baker  Reason for Consultation: Headache, weakness/numbness. HISTORY OF PRESENT ILLNESS:   Beth Munoz is a 43 y.o. female with hx of anxiety/depression and IBS, who presented with severe headache, left periorbital pain and left sided numbness, weakness and speech difficulty yesterday. The headache was throbbing in nature, with no nausea, but with photophobia and phonophobia. She does not have history of migraines or prior attacks like this. She was seen by teleneurology and her had CT was normal. She was admitted for further stroke work-up. MRI reports are pending. But to my wet read, there is no acute infarct or occlusion/stenosis on the MRI/MRA images. PAST MEDICAL HISTORY:  Past Medical History:   Diagnosis Date    Arthropathy     GERD (gastroesophageal reflux disease)     IBS (irritable bowel syndrome)     Mental disorder     Psychiatric disorder     PTSD (post-traumatic stress disorder)     UTI (lower urinary tract infection)      PAST SURGICAL HISTORY:  Past Surgical History:   Procedure Laterality Date    CYSTOURETHROSCOPY  7/1/2015         HX CARPAL TUNNEL RELEASE      HX HERNIA REPAIR      HX HYSTERECTOMY      HX KNEE ARTHROSCOPY      Right     FAMILY HISTORY:  History reviewed. No pertinent family history.   SOCIAL HISTORY:  Social History     Social History    Marital status: SINGLE     Spouse name: N/A    Number of children: N/A    Years of education: N/A     Social History Main Topics    Smoking status: Never Smoker    Smokeless tobacco: Never Used    Alcohol use 0.0 oz/week     0 Standard drinks or equivalent per week      Comment: socially     Drug use: No    Sexual activity: Yes     Partners: Male     Other Topics Concern    None     Social History Narrative     MEDICATIONS:  Current Facility-Administered Medications   Medication Dose Route Frequency    0.9% sodium chloride infusion  100 mL/hr IntraVENous CONTINUOUS    sodium chloride (NS) flush 5-10 mL  5-10 mL IntraVENous Q8H    sodium chloride (NS) flush 5-10 mL  5-10 mL IntraVENous PRN    aspirin chewable tablet 81 mg  81 mg Oral DAILY    atorvastatin (LIPITOR) tablet 80 mg  80 mg Oral QHS    acetaminophen (TYLENOL) tablet 650 mg  650 mg Oral Q4H PRN    famotidine (PEPCID) tablet 20 mg  20 mg Oral Q12H    heparin (porcine) injection 5,000 Units  5,000 Units SubCUTAneous Q8H    ondansetron (ZOFRAN) injection 4 mg  4 mg IntraVENous Q6H PRN    buPROPion SR (WELLBUTRIN SR) tablet 150 mg  150 mg Oral BID    dicyclomine (BENTYL) capsule 10 mg  10 mg Oral QID    sertraline (ZOLOFT) tablet 100 mg  100 mg Oral DAILY    ketorolac (TORADOL) injection 30 mg  30 mg IntraVENous Q6H PRN    acetaminophen (TYLENOL) tablet 650 mg  650 mg Oral Q6H PRN    ondansetron (ZOFRAN) injection 4 mg  4 mg IntraVENous Q6H PRN     Prior to Admission medications    Medication Sig Start Date End Date Taking? Authorizing Provider   MULTIVIT WITH CALCIUM,IRON,MIN [facility] DAILY PO) Take  by mouth daily. Yes Phu Valdovinos MD   buPROPion SR (WELLBUTRIN SR) 150 mg SR tablet Take  by mouth two (2) times a day. Yes Historical Provider   ergocalciferol (VITAMIN D2) 50,000 unit capsule Take 50,000 Units by mouth. Yes Historical Provider   esomeprazole (NEXIUM) 40 mg capsule Take  by mouth daily. Yes Historical Provider   fexofenadine (ALLEGRA) 180 mg tablet Take  by mouth daily. Yes Historical Provider   sertraline (ZOLOFT) 100 mg tablet Take  by mouth daily. Yes Historical Provider   traZODone (DESYREL) 100 mg tablet Take 100 mg by mouth nightly. Yes Historical Provider   oxyCODONE-acetaminophen (PERCOCET) 5-325 mg per tablet Take 1 Tab by mouth every four (4) hours as needed for Pain.     Phys MD Aruna CYCLOBENZAPRINE HCL (FLEXERIL PO) Take  by mouth. Phys Other, MD   dicyclomine (BENTYL) 10 mg capsule Take 10 mg by mouth 4 times daily (before meals and nightly). Historical Provider       ALLERGIES:  Allergies   Allergen Reactions    Shellfish Derived Angioedema    Sterapred [Prednisone] Not Reported This Time       Review of Systems  GENERAL: No fevers or chills. HEENT: Left eye pain, throbbing. Blurred vision. NECK: No pain or stiffness. CARDIOVASCULAR: No chest pain or pressure. No palpitations. PULMONARY: No shortness of breath, cough or wheeze. GASTROINTESTINAL: No abdominal pain, nausea, vomiting or diarrhea. GENITOURINARY: No urinary frequency, urgency, hesitancy or dysuria. MUSCULOSKELETAL: No joint or muscle pain, no back pain, no recent trauma. DERMATOLOGIC: No rash, no itching, no lesions. ENDOCRINE: No polyuria, polydipsia, no heat or cold intolerance. No recent change in weight. HEMATOLOGICAL: No anemia or easy bruising or bleeding. NEUROLOGIC: Left headache, left sided numbness/weakness, resolved with resolving headache. PHYSICAL EXAMINATION:     Visit Vitals    BP 99/62 (BP 1 Location: Left arm, BP Patient Position: At rest)    Pulse 79    Temp 97.6 °F (36.4 °C)    Resp 17    Ht 5' 7\" (1.702 m)    Wt 95.3 kg (210 lb)    SpO2 100%    Breastfeeding No    BMI 32.89 kg/m2      O2 Device: Room air  GENERAL: Pleasant, in no apparent distress. HEENT: Moist mucous membranes, sclerae anicteric, scalp is atraumatic. CVS: Regular rate and rhythm, no murmurs or gallops. No carotid bruits. PULMONARY: Clear to auscultation bilaterally. No rales or rhonchi. No wheezing. EXTREMITIES: Normal range of motion at all sites. No deformities. ABDOMEN: Soft, nontender. SKIN: No rashes or ecchymoses. Warm and dry. NEUROLOGIC: Alert and oriented x3. Speech is fluent without any aphasia or dysarthria. Cranial nerves: Face is symmetric with symmetric smile.  Facial sensation is intact. Extraocular movements are intact with no nystagmus. Visual fields are full to confrontation. PERRL. Tongue is midline. Palate elevates symmetrically. Hearing intact to speech. Sternocleidomastoid and trapezius strengths are full bilaterally. Motor: Normal tone and normal bulk on all four extremities. Strength is full on all four segmentally. There is no pronator drift or orbiting. Sensory: Intact to pinprick and touch on all four. Coordination: Intact coordination with finger-nose-finger bilaterally. Normal fine movements. No bradykinesia detected. Deep tendon reflexes: 2+ at biceps, brachioradialis, patella and ankles bilaterally. Toes are down-going bilaterally. Gait assessment: Able to stand and walk with no difficulty. Labs: Results:       Chemistry Recent Labs      06/07/18   0258  06/06/18   1800   GLU  117*  101*   NA  139  138   K  4.1  3.8   CL  105  103   CO2  27  24   BUN  9  11   CREA  0.90  0.93   CA  8.5  8.9   AGAP  7  11   BUCR  10*  12   AP   --   107   TP   --   8.7*   ALB   --   3.2*   GLOB   --   5.5*   AGRAT   --   0.6*      CBC w/Diff Recent Labs      06/07/18   0258  06/06/18   1800   WBC  7.6  7.4   RBC  3.82*  4.01*   HGB  10.8*  11.5*   HCT  33.2*  34.4*   PLT  344  356   GRANS   --   55   LYMPH   --   37   EOS   --   2      Cardiac Enzymes Recent Labs      06/06/18   1800   CPK  78   CKND1  CALCULATION NOT PERFORMED WHEN RESULT IS BELOW LINEAR LIMIT      Coagulation Recent Labs      06/06/18   1800   PTP  12.7   INR  1.0   APTT  27.8       Lipid Panel Lab Results   Component Value Date/Time    Cholesterol, total 153 06/07/2018 02:58 AM    HDL Cholesterol 40 06/07/2018 02:58 AM    LDL, calculated 93.8 06/07/2018 02:58 AM    VLDL, calculated 19.2 06/07/2018 02:58 AM    Triglyceride 96 06/07/2018 02:58 AM    CHOL/HDL Ratio 3.8 06/07/2018 02:58 AM      BNP No results for input(s): BNPP in the last 72 hours.    Liver Enzymes Recent Labs      06/06/18   1800   TP 8.7*   ALB  3.2*   AP  107   SGOT  28      Thyroid Studies No results found for: T4, T3U, TSH, TSHEXT       Radiology:  Ct Head Wo Cont    Result Date: 6/6/2018  EXAM: CT head INDICATION: Left facial weakness, confusion, headache. COMPARISON: None. TECHNIQUE: Axial CT imaging of the head was performed without intravenous contrast. One or more dose reduction techniques were used on this CT: automated exposure control, adjustment of the mAs and/or kVp according to patient's size, and iterative reconstruction techniques. The specific techniques utilized on this CT exam have been documented in the patient's electronic medical record. _______________ FINDINGS: BRAIN AND EXTRA-AXIAL SPACES: There is no intracranial hemorrhage, mass effect, or midline shift. There are no areas of abnormal parenchymal attenuation. There are no abnormal extra-axial fluid collections. CALVARIUM: Intact. SINUSES: Clear. OTHER: None. _______________     IMPRESSION: 1. No acute intracranial abnormalities. These findings were discussed with Dr. Yovanny Yepez at [facility] on 6/6/2018. Xr Chest Port    Result Date: 6/7/2018  --------------------------------------------------------------------------- <<<<<<<<<           [facility]           >>>>>>>>> --------------------------------------------------------------------------- CLINICAL HISTORY:  Chest pain. Shortness of breath. COMPARISON EXAMINATIONS:  None. ---  SINGLE FRONTAL VIEW OF THE CHEST  --- The lungs and pleural spaces are clear. The mediastinum is unremarkable in appearance. No significant osseous abnormalities are identified.  --------------    Impression: -------------- No active pulmonary disease. ASSESSMENT/IMPRESSION:  Atypical migraine attack, first time for the patient. She does not need any preventive medications at this time. She can benefit from acetaminophen as needed for headaches. No further work-up is indicated at this time. RECOMMENDATIONS:  1.  No further work-up. 2. After MRI reports return and there is no significant abnormality, she can be discharged home. 3. Acetaminophen as needed for headaches. 4. Assess by PT/OT before discharge. Neurology signs off. I will follow the patient as outpatient.  Please do not hesitate to return with any questions.    ------------------------------------  Isabelle Maldonado MD  6/7/2018  7:22 AM

## 2018-06-07 NOTE — PROGRESS NOTES
Oral and Written notification given to patient and/or caregiver informing them that they are currently an Outpatient receiving care in our facility. Outpatient services include Observation Services under [address] and [facility] requirements.

## 2018-06-07 NOTE — PROGRESS NOTES
Attempted to see pt for PT eval, 930. Pt BERONICA for Echo. Will follow up later as pt schedule allows.

## 2018-06-07 NOTE — ROUTINE PROCESS
Dual AVS reviewed with Chika Talamantes RN. All medications reviewed individually with patient. Opportunities for questions and concerns provided. Patient discharged via (mode of transport ie. Car, ambulance or air transport) car  Patient's arm band appropriately discarded.

## 2018-06-07 NOTE — PROGRESS NOTES
TRANSFER - IN REPORT:    Verbal report received from [address] (name) on Sofea  being received from ED (unit) for routine progression of care      Report consisted of patients Situation, Background, Assessment and   Recommendations(SBAR). Information from the following report(s) SBAR, ED Summary and MAR was reviewed with the receiving nurse. Opportunity for questions and clarification was provided. Assessment completed upon patients arrival to unit and care assumed. 2126 NIH scale, strip and skin check completed in the ED. Patient up to the unit. Neuro checks and VS initiated per protocol. Complains of pain addressed. 0600 Patient had uneventful night. Pain addressed as reported. NAD. Bedside and Verbal shift change report given to Trang Stover RN (oncoming nurse) by Pablito Peguero (offgoing nurse). Report included the following information SBAR, Kardex and MAR.

## 2018-06-07 NOTE — PROGRESS NOTES
Speech Therapy Note:    SLP orders received and attempted however, pt currently off the floor for echo.  SLP will f/u later this day or as medically indicated. D/w RN Giuliana Rodriguez  Thank you for this referral.     Rajendra Sanders M.S., CF-SLP  Speech Language Pathologist

## 2018-06-09 LAB
ATRIAL RATE: 76 BPM
CALCULATED P AXIS, ECG09: 35 DEGREES
CALCULATED R AXIS, ECG10: 25 DEGREES
CALCULATED T AXIS, ECG11: 7 DEGREES
DIAGNOSIS, 93000: NORMAL
P-R INTERVAL, ECG05: 138 MS
Q-T INTERVAL, ECG07: 360 MS
QRS DURATION, ECG06: 76 MS
QTC CALCULATION (BEZET), ECG08: 405 MS
VENTRICULAR RATE, ECG03: 76 BPM

## 2018-06-12 NOTE — PROGRESS NOTES
EMR entered and reviewed by Rehab Manager for the purpose of chart audit in the course of performing administrative functions and responsibilities related to performance consistency and improvement. NAILA Becker.Ed, [id number] [facility]

## 2018-06-13 ENCOUNTER — HOSPITAL ENCOUNTER (EMERGENCY)
Age: 38
Discharge: HOME | End: 2018-06-13
Attending: EMERGENCY MEDICINE
Payer: MEDICARE

## 2018-06-13 ENCOUNTER — APPOINTMENT (OUTPATIENT)
Dept: GENERAL RADIOLOGY | Age: 38
End: 2018-06-13
Attending: EMERGENCY MEDICINE
Payer: MEDICARE

## 2018-06-13 ENCOUNTER — APPOINTMENT (OUTPATIENT)
Dept: CT IMAGING | Age: 38
End: 2018-06-13
Attending: EMERGENCY MEDICINE
Payer: MEDICARE

## 2018-06-13 VITALS
SYSTOLIC BLOOD PRESSURE: 114 MMHG | DIASTOLIC BLOOD PRESSURE: 72 MMHG | WEIGHT: 210 LBS | BODY MASS INDEX: 32.96 KG/M2 | TEMPERATURE: 98 F | OXYGEN SATURATION: 100 % | HEART RATE: 84 BPM | HEIGHT: 67 IN | RESPIRATION RATE: 16 BRPM

## 2018-06-13 DIAGNOSIS — G43.109 COMPLICATED MIGRAINE: ICD-10-CM

## 2018-06-13 DIAGNOSIS — R55 SYNCOPE AND COLLAPSE: Primary | ICD-10-CM

## 2018-06-13 LAB
ALBUMIN SERPL-MCNC: 3.2 G/DL (ref 3.4–5)
ALBUMIN/GLOB SERPL: 0.6 {RATIO} (ref 0.8–1.7)
ALP SERPL-CCNC: 113 U/L (ref 45–117)
ALT SERPL-CCNC: 48 U/L (ref 13–56)
ANION GAP SERPL CALC-SCNC: 11 MMOL/L (ref 3–18)
APPEARANCE UR: CLEAR
AST SERPL-CCNC: 21 U/L (ref 15–37)
BASOPHILS # BLD: 0 K/UL (ref 0–0.06)
BASOPHILS NFR BLD: 0 % (ref 0–2)
BILIRUB SERPL-MCNC: 0.2 MG/DL (ref 0.2–1)
BILIRUB UR QL: NEGATIVE
BUN SERPL-MCNC: 11 MG/DL (ref 7–18)
BUN/CREAT SERPL: 10 (ref 12–20)
CALCIUM SERPL-MCNC: 8.9 MG/DL (ref 8.5–10.1)
CHLORIDE SERPL-SCNC: 99 MMOL/L (ref 100–108)
CK MB CFR SERPL CALC: NORMAL % (ref 0–4)
CK MB SERPL-MCNC: <1 NG/ML (ref 5–25)
CK SERPL-CCNC: 88 U/L (ref 26–192)
CO2 SERPL-SCNC: 27 MMOL/L (ref 21–32)
COLOR UR: YELLOW
CREAT SERPL-MCNC: 1.1 MG/DL (ref 0.6–1.3)
D DIMER PPP FEU-MCNC: 0.57 UG/ML(FEU)
DIFFERENTIAL METHOD BLD: ABNORMAL
EOSINOPHIL # BLD: 0.2 K/UL (ref 0–0.4)
EOSINOPHIL NFR BLD: 2 % (ref 0–5)
ERYTHROCYTE [DISTWIDTH] IN BLOOD BY AUTOMATED COUNT: 14.1 % (ref 11.6–14.5)
GLOBULIN SER CALC-MCNC: 5.8 G/DL (ref 2–4)
GLUCOSE SERPL-MCNC: 77 MG/DL (ref 74–99)
GLUCOSE UR STRIP.AUTO-MCNC: NEGATIVE MG/DL
HCG SERPL QL: NEGATIVE
HCT VFR BLD AUTO: 34.9 % (ref 35–45)
HGB BLD-MCNC: 11.6 G/DL (ref 12–16)
HGB UR QL STRIP: NEGATIVE
KETONES UR QL STRIP.AUTO: ABNORMAL MG/DL
LEUKOCYTE ESTERASE UR QL STRIP.AUTO: NEGATIVE
LYMPHOCYTES # BLD: 2.6 K/UL (ref 0.9–3.6)
LYMPHOCYTES NFR BLD: 35 % (ref 21–52)
MAGNESIUM SERPL-MCNC: 1.6 MG/DL (ref 1.6–2.6)
MCH RBC QN AUTO: 28.7 PG (ref 24–34)
MCHC RBC AUTO-ENTMCNC: 33.2 G/DL (ref 31–37)
MCV RBC AUTO: 86.4 FL (ref 74–97)
MONOCYTES # BLD: 0.4 K/UL (ref 0.05–1.2)
MONOCYTES NFR BLD: 5 % (ref 3–10)
NEUTS SEG # BLD: 4.3 K/UL (ref 1.8–8)
NEUTS SEG NFR BLD: 58 % (ref 40–73)
NITRITE UR QL STRIP.AUTO: NEGATIVE
PH UR STRIP: 5.5 [PH] (ref 5–8)
PLATELET # BLD AUTO: 371 K/UL (ref 135–420)
PMV BLD AUTO: 9.3 FL (ref 9.2–11.8)
POTASSIUM SERPL-SCNC: 3.6 MMOL/L (ref 3.5–5.5)
PROT SERPL-MCNC: 9 G/DL (ref 6.4–8.2)
PROT UR STRIP-MCNC: NEGATIVE MG/DL
RBC # BLD AUTO: 4.04 M/UL (ref 4.2–5.3)
SODIUM SERPL-SCNC: 137 MMOL/L (ref 136–145)
SP GR UR REFRACTOMETRY: >1.03 (ref 1–1.03)
TROPONIN I SERPL-MCNC: <0.02 NG/ML (ref 0–0.06)
UROBILINOGEN UR QL STRIP.AUTO: 1 EU/DL (ref 0.2–1)
WBC # BLD AUTO: 7.4 K/UL (ref 4.6–13.2)

## 2018-06-13 PROCEDURE — 96375 TX/PRO/DX INJ NEW DRUG ADDON: CPT

## 2018-06-13 PROCEDURE — 96361 HYDRATE IV INFUSION ADD-ON: CPT

## 2018-06-13 PROCEDURE — 83735 ASSAY OF MAGNESIUM: CPT | Performed by: EMERGENCY MEDICINE

## 2018-06-13 PROCEDURE — 93005 ELECTROCARDIOGRAM TRACING: CPT

## 2018-06-13 PROCEDURE — 81003 URINALYSIS AUTO W/O SCOPE: CPT | Performed by: EMERGENCY MEDICINE

## 2018-06-13 PROCEDURE — 74011250636 HC RX REV CODE- 250/636: Performed by: EMERGENCY MEDICINE

## 2018-06-13 PROCEDURE — 99284 EMERGENCY DEPT VISIT MOD MDM: CPT

## 2018-06-13 PROCEDURE — 96374 THER/PROPH/DIAG INJ IV PUSH: CPT

## 2018-06-13 PROCEDURE — 71045 X-RAY EXAM CHEST 1 VIEW: CPT

## 2018-06-13 PROCEDURE — 80053 COMPREHEN METABOLIC PANEL: CPT | Performed by: EMERGENCY MEDICINE

## 2018-06-13 PROCEDURE — 84703 CHORIONIC GONADOTROPIN ASSAY: CPT | Performed by: EMERGENCY MEDICINE

## 2018-06-13 PROCEDURE — 82553 CREATINE MB FRACTION: CPT | Performed by: EMERGENCY MEDICINE

## 2018-06-13 PROCEDURE — 85025 COMPLETE CBC W/AUTO DIFF WBC: CPT | Performed by: EMERGENCY MEDICINE

## 2018-06-13 PROCEDURE — 85379 FIBRIN DEGRADATION QUANT: CPT | Performed by: EMERGENCY MEDICINE

## 2018-06-13 PROCEDURE — 70450 CT HEAD/BRAIN W/O DYE: CPT

## 2018-06-13 RX ORDER — METOCLOPRAMIDE HYDROCHLORIDE 5 MG/ML
10 INJECTION INTRAMUSCULAR; INTRAVENOUS
Status: COMPLETED | OUTPATIENT
Start: 2018-06-13 | End: 2018-06-13

## 2018-06-13 RX ORDER — DIPHENHYDRAMINE HYDROCHLORIDE 50 MG/ML
25 INJECTION, SOLUTION INTRAMUSCULAR; INTRAVENOUS ONCE
Status: COMPLETED | OUTPATIENT
Start: 2018-06-13 | End: 2018-06-13

## 2018-06-13 RX ADMIN — METOCLOPRAMIDE 10 MG: 5 INJECTION, SOLUTION INTRAMUSCULAR; INTRAVENOUS at 18:00

## 2018-06-13 RX ADMIN — SODIUM CHLORIDE 1000 ML: 900 INJECTION, SOLUTION INTRAVENOUS at 18:00

## 2018-06-13 RX ADMIN — DIPHENHYDRAMINE HYDROCHLORIDE 25 MG: 50 INJECTION, SOLUTION INTRAMUSCULAR; INTRAVENOUS at 18:00

## 2018-06-13 NOTE — ED PROVIDER NOTES
EMERGENCY DEPARTMENT HISTORY AND PHYSICAL EXAM    Date: 6/13/2018  Patient Name: Kita Taylor    History of Presenting Illness     Chief Complaint   Patient presents with    Headache     History Provided By: Patient    Chief Complaint: HA  Duration: 6 Days  Timing:  Intermittent  Location: Left head  Quality: N/A  Severity: 7 out of 10  Modifying Factors: No relief with Tylenol   Associated Symptoms: N/V, syncope, and photophobia    Additional History (Context):   4:47 PM   Kita Taylor is a 43 y.o. female with PMHX of IBS, GERD, and arthritis who presents to the emergency department C/O intermittent 7/10 left sided HA, onset 6 days ago. Per son, pt fell today ~1 hour ago and passed out. Pt does not remember the fall and just \"remembers her son shaking her. \" Pt was DC from here on 6/7/2018 and was dx'ed with TIA. Pt states that the HA was not completely gone when she was DC. Associated sxs include N/V and photophobia. No relief with Tylenol (~4 hours ago). Pt has an appointment with the neurologist in August. Pt denies tobacco/EtOH/illicit drug use, use of blood thinners, leg pain, and any other sxs or complaints. PCP: Marcelina Mark NP    Current Outpatient Prescriptions   Medication Sig Dispense Refill    acetaminophen (TYLENOL) 500 mg tablet Take 1 Tab by mouth every four (4) hours as needed for Pain. 16 Tab 0    MULTIVIT WITH CALCIUM,IRON,MIN (WOMEN'S DAILY PO) Take  by mouth daily.  buPROPion SR (WELLBUTRIN SR) 150 mg SR tablet Take  by mouth two (2) times a day.  dicyclomine (BENTYL) 10 mg capsule Take 10 mg by mouth 4 times daily (before meals and nightly).  ergocalciferol (VITAMIN D2) 50,000 unit capsule Take 50,000 Units by mouth.  esomeprazole (NEXIUM) 40 mg capsule Take  by mouth daily.  fexofenadine (ALLEGRA) 180 mg tablet Take  by mouth daily.  sertraline (ZOLOFT) 100 mg tablet Take  by mouth daily.       traZODone (DESYREL) 100 mg tablet Take 100 mg by mouth nightly. Past History     Past Medical History:  Past Medical History:   Diagnosis Date    Arthropathy     GERD (gastroesophageal reflux disease)     IBS (irritable bowel syndrome)     Mental disorder     Psychiatric disorder     PTSD (post-traumatic stress disorder)     UTI (lower urinary tract infection)        Past Surgical History:  Past Surgical History:   Procedure Laterality Date    CYSTOURETHROSCOPY  7/1/2015         HX CARPAL TUNNEL RELEASE      HX HERNIA REPAIR      HX HYSTERECTOMY      HX KNEE ARTHROSCOPY      Right       Family History:  History reviewed. No pertinent family history. Social History:  Social History   Substance Use Topics    Smoking status: Never Smoker    Smokeless tobacco: Never Used    Alcohol use 0.0 oz/week     0 Standard drinks or equivalent per week      Comment: socially        Allergies: Allergies   Allergen Reactions    Shellfish Derived Angioedema    Sterapred [Prednisone] Not Reported This Time         Review of Systems   Review of Systems   Eyes: Positive for photophobia. Gastrointestinal: Positive for nausea and vomiting. Musculoskeletal: Negative for myalgias. Neurological: Positive for syncope and headaches (left side). All other systems reviewed and are negative. Physical Exam     Vitals:    06/13/18 1648   BP: 142/90   Pulse: (!) 111   Resp: 20   Temp: 98 °F (36.7 °C)   SpO2: 100%   Weight: 95.3 kg (210 lb)   Height: 5' 7\" (1.702 m)     Physical Exam   Nursing note and vitals reviewed. Constitutional: Well appearing. Mild distress. Head: Normocephalic, Atraumatic  Eyes: Pupils are equal, round, and reactive to light, EOMI. Photophobia.    Neck: Supple, non-tender  Cardiovascular: Regular rate and rhythm, no murmurs, rubs, or gallops  Chest: Normal work of breathing and chest excursion bilaterally  Lungs: Clear to ausculation bilaterally  Abdomen: Soft, non tender, non distended, normoactive bowel sounds  Back: No evidence of trauma or deformity  Extremities: No evidence of trauma or deformity, no LE edema  Skin: Warm and dry, normal cap refill  Neuro: Alert and appropriate, CN intact, normal speech, strength and sensation full and symmetric bilaterally, normal gait, normal coordination  Psychiatric: Normal mood and affect    Diagnostic Study Results     Labs -     Recent Results (from the past 12 hour(s))   CBC WITH AUTOMATED DIFF    Collection Time: 06/13/18  6:09 PM   Result Value Ref Range    WBC 7.4 4.6 - 13.2 K/uL    RBC 4.04 (L) 4.20 - 5.30 M/uL    HGB 11.6 (L) 12.0 - 16.0 g/dL    HCT 34.9 (L) 35.0 - 45.0 %    MCV 86.4 74.0 - 97.0 FL    MCH 28.7 24.0 - 34.0 PG    MCHC 33.2 31.0 - 37.0 g/dL    RDW 14.1 11.6 - 14.5 %    PLATELET 966 157 - 933 K/uL    MPV 9.3 9.2 - 11.8 FL    NEUTROPHILS 58 40 - 73 %    LYMPHOCYTES 35 21 - 52 %    MONOCYTES 5 3 - 10 %    EOSINOPHILS 2 0 - 5 %    BASOPHILS 0 0 - 2 %    ABS. NEUTROPHILS 4.3 1.8 - 8.0 K/UL    ABS. LYMPHOCYTES 2.6 0.9 - 3.6 K/UL    ABS. MONOCYTES 0.4 0.05 - 1.2 K/UL    ABS. EOSINOPHILS 0.2 0.0 - 0.4 K/UL    ABS. BASOPHILS 0.0 0.0 - 0.06 K/UL    DF AUTOMATED     METABOLIC PANEL, COMPREHENSIVE    Collection Time: 06/13/18  6:09 PM   Result Value Ref Range    Sodium 137 136 - 145 mmol/L    Potassium 3.6 3.5 - 5.5 mmol/L    Chloride 99 (L) 100 - 108 mmol/L    CO2 27 21 - 32 mmol/L    Anion gap 11 3.0 - 18 mmol/L    Glucose 77 74 - 99 mg/dL    BUN 11 7.0 - 18 MG/DL    Creatinine 1.10 0.6 - 1.3 MG/DL    BUN/Creatinine ratio 10 (L) 12 - 20      GFR est AA >60 >60 ml/min/1.73m2    GFR est non-AA 55 (L) >60 ml/min/1.73m2    Calcium 8.9 8.5 - 10.1 MG/DL    Bilirubin, total 0.2 0.2 - 1.0 MG/DL    ALT (SGPT) 48 13 - 56 U/L    AST (SGOT) 21 15 - 37 U/L    Alk.  phosphatase 113 45 - 117 U/L    Protein, total 9.0 (H) 6.4 - 8.2 g/dL    Albumin 3.2 (L) 3.4 - 5.0 g/dL    Globulin 5.8 (H) 2.0 - 4.0 g/dL    A-G Ratio 0.6 (L) 0.8 - 1.7     MAGNESIUM    Collection Time: 06/13/18  6:09 PM   Result Value Ref Range    Magnesium 1.6 1.6 - 2.6 mg/dL   CARDIAC PANEL,(CK, CKMB & TROPONIN)    Collection Time: 06/13/18  6:09 PM   Result Value Ref Range    CK 88 26 - 192 U/L    CK - MB <1.0 <3.6 ng/ml    CK-MB Index  0.0 - 4.0 %     CALCULATION NOT PERFORMED WHEN RESULT IS BELOW LINEAR LIMIT    Troponin-I, Qt. <0.02 0.00 - 0.06 NG/ML   D DIMER    Collection Time: 06/13/18  6:09 PM   Result Value Ref Range    D DIMER 0.57 (H) <0.46 ug/ml(FEU)   HCG QL SERUM    Collection Time: 06/13/18  6:09 PM   Result Value Ref Range    HCG, Ql. NEGATIVE  NEG     URINALYSIS W/ RFLX MICROSCOPIC    Collection Time: 06/13/18  6:55 PM   Result Value Ref Range    Color YELLOW      Appearance CLEAR      Specific gravity >1.030 (H) 1.005 - 1.030    pH (UA) 5.5 5.0 - 8.0      Protein NEGATIVE  NEG mg/dL    Glucose NEGATIVE  NEG mg/dL    Ketone TRACE (A) NEG mg/dL    Bilirubin NEGATIVE  NEG      Blood NEGATIVE  NEG      Urobilinogen 1.0 0.2 - 1.0 EU/dL    Nitrites NEGATIVE  NEG      Leukocyte Esterase NEGATIVE  NEG     EKG, 12 LEAD, INITIAL    Collection Time: 06/13/18  7:09 PM   Result Value Ref Range    Ventricular Rate 82 BPM    Atrial Rate 82 BPM    P-R Interval 130 ms    QRS Duration 76 ms    Q-T Interval 364 ms    QTC Calculation (Bezet) 425 ms    Calculated P Axis 57 degrees    Calculated R Axis 27 degrees    Calculated T Axis 6 degrees    Diagnosis       Normal sinus rhythm  Normal ECG  When compared with ECG of 06-JUN-2018 18:23,  No significant change was found         Radiologic Studies -     RADIOLOGY FINDINGS  Chest X-ray shows no acute process  Pending review by Radiologist  Recorded by Taco Harkins ED Scribrussell, as dictated by Madison Resendez MD     XR CHEST PORT    (Results Pending)   CT HEAD WO CONT      CT Results  (Last 48 hours)               06/13/18 1851  CT HEAD WO CONT Final result    Impression:  IMPRESSION:       No acute intracranial abnormalities.        Narrative:  EXAM: CT head       INDICATION: Fell hit head COMPARISON: June 6, 2018       TECHNIQUE: Axial CT imaging of the head was performed without intravenous   contrast.       DOSE REDUCTION:  One or more dose reduction techniques were used on this CT:   automated exposure control, adjustment of the mAs and/or kVp according to   patient's size, and iterative reconstruction techniques. The specific techniques   utilized on this CT exam have been documented in the patient's electronic   medical record.       _______________       FINDINGS:       BRAIN AND POSTERIOR FOSSA: The sulci, folia, ventricles and basal cisterns are   within normal limits for the patient?s age. There is no intracranial hemorrhage,   mass effect, or midline shift. There are no areas of abnormal parenchymal   attenuation. EXTRA-AXIAL SPACES AND MENINGES: There are no abnormal extra-axial fluid   collections. CALVARIUM: Intact. SINUSES: Clear. OTHER: None.       _______________                 Medications given in the ED-  Medications   metoclopramide HCl (REGLAN) injection 10 mg (10 mg IntraVENous Given 6/13/18 1800)   diphenhydrAMINE (BENADRYL) injection 25 mg (25 mg IntraVENous Given 6/13/18 1800)   sodium chloride 0.9 % bolus infusion 1,000 mL (1,000 mL IntraVENous New Bag 6/13/18 1800)         Medical Decision Making   I am the first provider for this patient. I reviewed the vital signs, available nursing notes, past medical history, past surgical history, family history and social history. Vital Signs-Reviewed the patient's vital signs. Pulse Oximetry Analysis - 100% on RA     EKG interpretation: (Preliminary)  7:09 PM  Rhythm: NSR. Rate (approx.): 82 bpm. QRS duration of 76 ms. EKG read by Natali Roberts MD at 7:17 PM       Records Reviewed: Nursing Notes    Procedures:  Procedures    ED Course:   4:47 PM Initial assessment performed.  The patients presenting problems have been discussed, and they are in agreement with the care plan formulated and outlined with them. I have encouraged them to ask questions as they arise throughout their visit. 6:36 PM Pts HA is down to a 4/10. States she feels much better. D dimer is slightly elevated but insists that she never had any CP or SOB therefore will not pursue any PE investigation at this time. Diagnosis and Disposition     Discussion: 43 y.o. F returning to ED after episode of syncope and with HA similar to complex migraines for which she was admitted earlier this month and had negative MRI, MRA, and ECHO. HA resolved here with medical management. Nml neuro exam. D dimer slightly elevated but denies any PE sxs including CP and SOB. Plan for DC with neuro and PCP follow up and strict return precautions. Pt understands and agrees with this plan. DISCHARGE NOTE:  7:29 PM  Collins Bañuelos's  results have been reviewed with her. She has been counseled regarding her diagnosis, treatment, and plan. She verbally conveys understanding and agreement of the signs, symptoms, diagnosis, treatment and prognosis and additionally agrees to follow up as discussed. She also agrees with the care-plan and conveys that all of her questions have been answered. I have also provided discharge instructions for her that include: educational information regarding their diagnosis and treatment, and list of reasons why they would want to return to the ED prior to their follow-up appointment, should her condition change. She has been provided with education for proper emergency department utilization. CLINICAL IMPRESSION:    1. Syncope and collapse    2. Complicated migraine        PLAN:  1. D/C Home  2. Current Discharge Medication List        3.    Follow-up Information     Follow up With Details Comments Contact Info    Haydee Sánchez MD Schedule an appointment as soon as possible for a visit For follow up with neurology [address] [place] [address]  [address] [place] [address] [address] Josewissa, NP Go in 1 day For primary care follow up as scheduled tomorrow [address] [address]  [phone]      [facility] EM[facility] Go to As needed, if symptoms worsen [address] [facility] [address]  [phone]        _______________________________    Attestations: This note is prepared by Kristel Nava and Juanjose Rueda, acting as Scribe for Chris Laureano MD.    Chris Laureano MD:  The scribe's documentation has been prepared under my direction and personally reviewed by me in its entirety.   I confirm that the note above accurately reflects all work, treatment, procedures, and medical decision making performed by me.  _______________________________

## 2018-06-13 NOTE — ED TRIAGE NOTES
Patient presents note form sons stating that she had fallen about 1 hour prior to arrival. Patient was seen and evaluated las week for a complex migraine. Patient states that she has pressure to the left eye and numbness to the left side.

## 2018-06-13 NOTE — DISCHARGE INSTRUCTIONS
Fainting: Care Instructions  Your Care Instructions    When you faint, or pass out, you lose consciousness for a short time. A brief drop in blood flow to the brain often causes it. When you fall or lie down, more blood flows to your brain and you regain consciousness. Emotional stress, pain, or overheating-especially if you have been standing-can make you faint. In these cases, fainting is usually not serious. But fainting can be a sign of a more serious problem. Your doctor may want you to have more tests to rule out other causes. The treatment you need depends on the reason why you fainted. The doctor has checked you carefully, but problems can develop later. If you notice any problems or new symptoms, get medical treatment right away. Follow-up care is a key part of your treatment and safety. Be sure to make and go to all appointments, and call your doctor if you are having problems. It's also a good idea to know your test results and keep a list of the medicines you take. How can you care for yourself at home? · Drink plenty of fluids to prevent dehydration. If you have kidney, heart, or liver disease and have to limit fluids, talk with your doctor before you increase your fluid intake. When should you call for help? Call 911 anytime you think you may need emergency care. For example, call if:  ? · You have symptoms of a heart problem. These may include:  ¨ Chest pain or pressure. ¨ Severe trouble breathing. ¨ A fast or irregular heartbeat. ¨ Lightheadedness or sudden weakness. ¨ Coughing up pink, foamy mucus. ¨ Passing out. After you call 911, the  may tell you to chew 1 adult-strength or 2 to 4 low-dose aspirin. Wait for an ambulance. Do not try to drive yourself. ? · You have symptoms of a stroke. These may include:  ¨ Sudden numbness, tingling, weakness, or loss of movement in your face, arm, or leg, especially on only one side of your body. ¨ Sudden vision changes.   ¨ Sudden trouble speaking. ¨ Sudden confusion or trouble understanding simple statements. ¨ Sudden problems with walking or balance. ¨ A sudden, severe headache that is different from past headaches. ? · You passed out (lost consciousness) again. ? Watch closely for changes in your health, and be sure to contact your doctor if:  ? · You do not get better as expected. Where can you learn more? Go to http://zuhair-tin.info/. Enter A548 in the search box to learn more about \"Fainting: Care Instructions. \"  Current as of: March 20, 2017  Content Version: 11.4  © 4218-3023 [employer]. Care instructions adapted under license by [employer] (which disclaims liability or warranty for this information). If you have questions about a medical condition or this instruction, always ask your healthcare professional. [address] any warranty or liability for your use of this information. Migraine Headache: Care Instructions  Your Care Instructions  Migraines are painful, throbbing headaches that often start on one side of the head. They may cause nausea and vomiting and make you sensitive to light, sound, or smell. Without treatment, migraines can last from 4 hours to a few days. Medicines can help prevent migraines or stop them after they have started. Your doctor can help you find which ones work best for you. Follow-up care is a key part of your treatment and safety. Be sure to make and go to all appointments, and call your doctor if you are having problems. It's also a good idea to know your test results and keep a list of the medicines you take. How can you care for yourself at home? · Do not drive if you have taken a prescription pain medicine. · Rest in a quiet, dark room until your headache is gone. Close your eyes, and try to relax or go to sleep. Don't watch TV or read.   · Put a cold, moist cloth or cold pack on the painful area for 10 to 20 minutes at a time. Put a thin cloth between the cold pack and your skin. · Use a warm, moist towel or a heating pad set on low to relax tight shoulder and neck muscles. · Have someone gently massage your neck and shoulders. · Take your medicines exactly as prescribed. Call your doctor if you think you are having a problem with your medicine. You will get more details on the specific medicines your doctor prescribes. · Be careful not to take pain medicine more often than the instructions allow. You could get worse or more frequent headaches when the medicine wears off. To prevent migraines  · Keep a headache diary so you can figure out what triggers your headaches. Avoiding triggers may help you prevent headaches. Record when each headache began, how long it lasted, and what the pain was like. (Was it throbbing, aching, stabbing, or dull?) Write down any other symptoms you had with the headache, such as nausea, flashing lights or dark spots, or sensitivity to bright light or loud noise. Note if the headache occurred near your period. List anything that might have triggered the headache. Triggers may include certain foods (chocolate, cheese, wine) or odors, smoke, bright light, stress, or lack of sleep. · If your doctor has prescribed medicine for your migraines, take it as directed. You may have medicine that you take only when you get a migraine and medicine that you take all the time to help prevent migraines. ¨ If your doctor has prescribed medicine for when you get a headache, take it at the first sign of a migraine, unless your doctor has given you other instructions. ¨ If your doctor has prescribed medicine to prevent migraines, take it exactly as prescribed. Call your doctor if you think you are having a problem with your medicine. · Find healthy ways to deal with stress. Migraines are most common during or right after stressful times.  Take time to relax before and after you do something that has caused a migraine in the past.  · Try to keep your muscles relaxed by keeping good posture. Check your jaw, face, neck, and shoulder muscles for tension. Try to relax them. When you sit at a desk, change positions often. And make sure to stretch for 30 seconds each hour. · Get plenty of sleep and exercise. · Eat meals on a regular schedule. Avoid foods and drinks that often trigger migraines. These include chocolate, alcohol (especially red wine and port), aspartame, monosodium glutamate (MSG), and some additives found in foods (such as hot dogs, cohn, cold cuts, aged cheeses, and pickled foods). · Limit caffeine. Don't drink too much coffee, tea, or soda. But don't quit caffeine suddenly. That can also give you migraines. · Do not smoke or allow others to smoke around you. If you need help quitting, talk to your doctor about stop-smoking programs and medicines. These can increase your chances of quitting for good. · If you are taking birth control pills or hormone therapy, talk to your doctor about whether they are triggering your migraines. When should you call for help? Call 911 anytime you think you may need emergency care. For example, call if:  ? · You have signs of a stroke. These may include:  ¨ Sudden numbness, paralysis, or weakness in your face, arm, or leg, especially on only one side of your body. ¨ Sudden vision changes. ¨ Sudden trouble speaking. ¨ Sudden confusion or trouble understanding simple statements. ¨ Sudden problems with walking or balance. ¨ A sudden, severe headache that is different from past headaches. ?Call your doctor now or seek immediate medical care if:  ? · You have new or worse nausea and vomiting. ? · You have a new or higher fever. ? · Your headache gets much worse. ? Watch closely for changes in your health, and be sure to contact your doctor if:  ? · You are not getting better after 2 days (48 hours). Where can you learn more?   Go to http://zuhair-tin.info/. Enter G867 in the search box to learn more about \"Migraine Headache: Care Instructions. \"  Current as of: October 14, 2016  Content Version: 11.4  © 9849-2275 [employer]. Care instructions adapted under license by [employer] (which disclaims liability or warranty for this information). If you have questions about a medical condition or this instruction, always ask your healthcare professional. [place] [address] any warranty or liability for your use of this information.

## 2018-06-14 LAB
ATRIAL RATE: 82 BPM
CALCULATED P AXIS, ECG09: 57 DEGREES
CALCULATED R AXIS, ECG10: 27 DEGREES
CALCULATED T AXIS, ECG11: 6 DEGREES
DIAGNOSIS, 93000: NORMAL
P-R INTERVAL, ECG05: 130 MS
Q-T INTERVAL, ECG07: 364 MS
QRS DURATION, ECG06: 76 MS
QTC CALCULATION (BEZET), ECG08: 425 MS
VENTRICULAR RATE, ECG03: 82 BPM

## 2018-07-15 ENCOUNTER — HOSPITAL ENCOUNTER (EMERGENCY)
Age: 38
Discharge: OTHER INSTITUTION | End: 2018-07-15
Attending: INTERNAL MEDICINE
Payer: MEDICARE

## 2018-07-15 ENCOUNTER — APPOINTMENT (OUTPATIENT)
Dept: CT IMAGING | Age: 38
End: 2018-07-15
Attending: PHYSICIAN ASSISTANT
Payer: MEDICARE

## 2018-07-15 VITALS
DIASTOLIC BLOOD PRESSURE: 98 MMHG | OXYGEN SATURATION: 100 % | WEIGHT: 210 LBS | HEART RATE: 89 BPM | TEMPERATURE: 98.5 F | HEIGHT: 67 IN | BODY MASS INDEX: 32.96 KG/M2 | RESPIRATION RATE: 18 BRPM | SYSTOLIC BLOOD PRESSURE: 138 MMHG

## 2018-07-15 DIAGNOSIS — H57.11 PAIN OF RIGHT EYE: Primary | ICD-10-CM

## 2018-07-15 DIAGNOSIS — J00 ACUTE RHINITIS: ICD-10-CM

## 2018-07-15 PROCEDURE — 96372 THER/PROPH/DIAG INJ SC/IM: CPT

## 2018-07-15 PROCEDURE — 74011250636 HC RX REV CODE- 250/636: Performed by: PHYSICIAN ASSISTANT

## 2018-07-15 PROCEDURE — 74011000250 HC RX REV CODE- 250: Performed by: PHYSICIAN ASSISTANT

## 2018-07-15 PROCEDURE — 99284 EMERGENCY DEPT VISIT MOD MDM: CPT

## 2018-07-15 PROCEDURE — 70450 CT HEAD/BRAIN W/O DYE: CPT

## 2018-07-15 RX ORDER — PROPARACAINE HYDROCHLORIDE 5 MG/ML
1 SOLUTION/ DROPS OPHTHALMIC
Status: COMPLETED | OUTPATIENT
Start: 2018-07-15 | End: 2018-07-15

## 2018-07-15 RX ORDER — KETOROLAC TROMETHAMINE 15 MG/ML
15 INJECTION, SOLUTION INTRAMUSCULAR; INTRAVENOUS
Status: COMPLETED | OUTPATIENT
Start: 2018-07-15 | End: 2018-07-15

## 2018-07-15 RX ADMIN — FLUORESCEIN SODIUM 1 STRIP: 0.6 STRIP OPHTHALMIC at 16:28

## 2018-07-15 RX ADMIN — PROPARACAINE HYDROCHLORIDE 1 DROP: 5 SOLUTION/ DROPS OPHTHALMIC at 16:28

## 2018-07-15 RX ADMIN — KETOROLAC TROMETHAMINE 15 MG: 15 INJECTION, SOLUTION INTRAMUSCULAR; INTRAVENOUS at 16:29

## 2018-07-15 NOTE — ED PROVIDER NOTES
EMERGENCY DEPARTMENT HISTORY AND PHYSICAL EXAM    Date: 7/15/2018  Patient Name: Val Mcgarry    History of Presenting Illness     Chief Complaint   Patient presents with    Eye Pain         History Provided By: Patient    Chief Complaint: Right eye pain  Duration: 1 Days  Timing:  Acute  Location: Right eye pain  Quality: Throbbing  Severity: 10 out of 10  Modifying Factors: No relieving or worsening factors  Associated Symptoms: denies any other associated signs or symptoms    Additional History (Context):   2:57 PM  Val Mcgarry is a 43 y.o. female with PMHX of PTSD, IBS, and migraines who presents to the emergency department C/O 10/10 throbbing right eye pain x~ 1 day. Reports that she wears glasses for reading. Says that she last got her eyes checked the two years ago and \"everything was fine\". Reports that she has an allergy to Prednisone. Pt denies visual changes, and any other sxs or complaints. PCP: Lilo Adhikari NP    Current Outpatient Prescriptions   Medication Sig Dispense Refill    buPROPion SR [facility] SR) 150 mg SR tablet Take  by mouth two (2) times a day.  esomeprazole (NEXIUM) 40 mg capsule Take  by mouth daily.  fexofenadine (ALLEGRA) 180 mg tablet Take  by mouth daily.  sertraline (ZOLOFT) 100 mg tablet Take  by mouth daily.  acetaminophen (TYLENOL) 500 mg tablet Take 1 Tab by mouth every four (4) hours as needed for Pain. 16 Tab 0    MULTIVIT WITH CALCIUM,IRON,MIN (WOMEN'S DAILY PO) Take  by mouth daily.  dicyclomine (BENTYL) 10 mg capsule Take 10 mg by mouth 4 times daily (before meals and nightly).  ergocalciferol (VITAMIN D2) 50,000 unit capsule Take 50,000 Units by mouth.  traZODone (DESYREL) 100 mg tablet Take 100 mg by mouth nightly.          Past History     Past Medical History:  Past Medical History:   Diagnosis Date    Arthropathy     GERD (gastroesophageal reflux disease)     IBS (irritable bowel syndrome)     Mental disorder  Psychiatric disorder     PTSD (post-traumatic stress disorder)     UTI (lower urinary tract infection)        Past Surgical History:  Past Surgical History:   Procedure Laterality Date    CYSTOURETHROSCOPY  7/1/2015         HX CARPAL TUNNEL RELEASE      HX HERNIA REPAIR      HX HYSTERECTOMY      HX KNEE ARTHROSCOPY      Right       Family History:  History reviewed. No pertinent family history. Social History:  Social History   Substance Use Topics    Smoking status: Never Smoker    Smokeless tobacco: Never Used    Alcohol use 0.0 oz/week     0 Standard drinks or equivalent per week      Comment: socially        Allergies: Allergies   Allergen Reactions    Shellfish Derived Angioedema    Sterapred [Prednisone] Not Reported This Time         Review of Systems   Review of Systems  Constitutional:  Denies malaise, fever, chills. Head:  Denies injury. Face:  Denies injury or pain. ENMT:  Denies sore throat. Neck:  Denies injury or pain. Chest:  Denies injury. Cardiac:  Denies chest pain or palpitations. Respiratory:  Denies cough, wheezing, difficulty breathing, shortness of breath. GI/ABD:  Denies injury, pain, distention, nausea, vomiting, diarrhea. :  Denies injury, pain, dysuria or urgency. Back:  Denies injury or pain. Pelvis:  Denies injury or pain. Extremity/MS:  Denies injury or pain. Neuro:  Denies headache, LOC, dizziness, neurologic symptoms/deficits/paresthesias. Skin: Denies injury, rash, itching or skin changes. Physical Exam     Vitals:    07/15/18 1442 07/15/18 1911   BP: 135/90 (!) 138/98   Pulse: 87 89   Resp: 18 18   Temp: 98.3 °F (36.8 °C) 98.5 °F (36.9 °C)   SpO2: 100% 100%   Weight: 95.3 kg (210 lb)    Height: 5' 7\" (1.702 m)      Physical Exam  CONSTITUTIONAL: Alert, in no apparent distress; well-developed; well-nourished. HEAD:  Normocephalic, atraumatic. EYES: PERRL; EOM's intact.  Conjunctiva not injected, mild periorbital tenderness  ENTM: Nose: No rhinorrhea; Throat: mucous membranes moist. Posterior pharynx-normal., TM's shinny  Neck:  No JVD, supple without lymphadenopathy. RESP: Chest clear, equal breath sounds. CV: S1 and S2 WNL; No murmurs, gallops or rubs. GI: Abdomen soft and non-tender. No masses or organomegaly. UPPER EXT:  Normal inspection. LOWER EXT: Normal inspection. NEURO: strength 5/5 and sym, sensation intact. SKIN: No rashes; Normal for age and stage. PSYCH:  Alert and oriented, normal affect. Diagnostic Study Results     Labs -   No results found for this or any previous visit (from the past 12 hour(s)). Radiologic Studies -   CT HEAD WO CONT   Final Result        CT Results  (Last 48 hours)               07/15/18 1650  CT HEAD WO CONT Final result    Impression:  IMPRESSION:       No acute intracranial abnormalities. Narrative:  EXAM: CT head       INDICATION: Right-sided eye pain       COMPARISON: June 13, 2018       TECHNIQUE: Axial CT imaging of the head was performed without intravenous   contrast.       DOSE REDUCTION:  One or more dose reduction techniques were used on this CT:   automated exposure control, adjustment of the mAs and/or kVp according to   patient's size, and iterative reconstruction techniques. The specific techniques   utilized on this CT exam have been documented in the patient's electronic   medical record.       _______________       FINDINGS:       BRAIN AND POSTERIOR FOSSA: The sulci, folia, ventricles and basal cisterns are   within normal limits for the patient?s age. There is no intracranial hemorrhage,   mass effect, or midline shift. There are no areas of abnormal parenchymal   attenuation. EXTRA-AXIAL SPACES AND MENINGES: There are no abnormal extra-axial fluid   collections. CALVARIUM: Intact. SINUSES: Clear.        OTHER: None.       _______________               CXR Results  (Last 48 hours)    None          Medications given in the ED-  Medications   proparacaine (OPTHAINE) 0.5 % ophthalmic solution 1 Drop (1 Drop Both Eyes Given 7/15/18 1628)   fluorescein (FLU-SHER) 0.6 mg ophthalmic strip 1 Strip (1 Strip Both Eyes Given 7/15/18 1628)   ketorolac (TORADOL) injection 15 mg (15 mg IntraMUSCular Given 7/15/18 1629)         Medical Decision Making   I am the first provider for this patient. I reviewed the vital signs, available nursing notes, past medical history, past surgical history, family history and social history. Vital Signs-Reviewed the patient's vital signs. Pulse Oximetry Analysis - 100% on room air     Records Reviewed: Nursing Notes and Old Medical Records    Provider Notes (Medical Decision Making):   Corneal abrasion, iritis, glaucoma, migraine  IMPRESSION AND MEDICAL DECISION MAKING:  Based upon the patient's presentation with noted HPI and PE, along with the work up done in the emergency department, I believe that the patient has elevated eye pressure. Pt request transfer to [facility] for further evaluation for her elevated eye pressure. Procedures:  Eye Stain    Date/Time: 7/15/2018 3:29 PM    Performed by: PA        Corneal abrasion was not present on eyelid eversion. Cornea is clear. Anterior chamber is clear. Patient tolerance: Patient tolerated the procedure well with no immediate complications  My total time at bedside, performing this procedure was 16-30 minutes. Comments: Micah-pen pressure right 36, left 44        ED Course:   2:57 PM Initial assessment performed. The patients presenting problems have been discussed, and they are in agreement with the care plan formulated and outlined with them. I have encouraged them to ask questions as they arise throughout their visit. 3:40 PM Discussed patient's history, exam, and available diagnostics results with Leopold Rouse, MD, emergency medicine attending at this facility, who advised seeking an opthalmology consultation.      3:59 PM Contacted several ophthalmologist but they were reluctant to consult on the case or give medical advice due to the fact that they were not on call for our hospital. Today we have no on call ophthalmologist.    4:06 PM Discussed patient's history, exam, and available diagnostics results with Dr. Ana Shoemaker MD, emergency medicine doctor at [facility], who recommended treating non ophthalmological issues. 4:26 PM Repeat Micah-pen measurements: Right 49, left 52    6:28 PM Pt states that she feels much better at this time. Still concerned for Corneal abrasion, iritis, glaucoma, migraine.    7:01 PM Pt opted to transfer to [facility] via EMS. Diagnosis and Disposition       DISCHARGE NOTE:  6:53 PM    Collins Bañuelos's  results have been reviewed with her. She has been counseled regarding her diagnosis, treatment, and plan. She verbally conveys understanding and agreement of the signs, symptoms, diagnosis, treatment and prognosis and additionally agrees to follow up as discussed. She also agrees with the care-plan and conveys that all of her questions have been answered. I have also provided discharge instructions for her that include: educational information regarding their diagnosis and treatment, and list of reasons why they would want to return to the ED prior to their follow-up appointment, should her condition change. She has been provided with education for proper emergency department utilization. CLINICAL IMPRESSION:    1. Pain of right eye    2. Acute rhinitis        PLAN:  1. Transfer to [facility]  2. Current Discharge Medication List        3.    Follow-up Information     Follow up With Details Comments [address], NP Schedule an appointment as soon as possible for a visit in 2 days For primary care follow up [address]  [phone]      Your ophtamologist Schedule an appointment as soon as possible for a visit in 2 days For ophthamology follow up     [facility] EMERGENCY DEPT Go to As needed, if symptoms worsen [address] [facility] [address] [phone]        _______________________________    Attestations: This note is prepared by Frandy Herrmann, acting as Scribe for Ilda Self PA-C. Ilda Self PA-C:  The scribe's documentation has been prepared under my direction and personally reviewed by me in its entirety. I confirm that the note above accurately reflects all work, treatment, procedures, and medical decision making performed by me. This note is prepared by Austin Daley, acting as Scribe for Ilda Self PA-C. Ilda Self PA-C: The scribe's documentation has been prepared under my direction and personally reviewed by me in its entirety.  I confirm that the note above accurately reflects all work, treatment, procedures, and medical decision making performed by me.   _______________________________

## 2018-07-15 NOTE — ED NOTES
Pt states pain is getting better, pt resting on stretcher, spouse at bedside, bed in low position, call bell within reach

## 2018-07-15 NOTE — ED NOTES
Pt remains on stretcher, no needs expressed at this time, pt states pain approx 3/10, pt describes pain as pressure in rt eye, denies visual disturbance at this time, pt states at times it is blurry

## 2018-07-15 NOTE — DISCHARGE INSTRUCTIONS
Rhinitis: Care Instructions  Your Care Instructions  Rhinitis is swelling and irritation in the nose. Allergies and infections are often the cause. Your nose may run or feel stuffy. Other symptoms are itchy and sore eyes, ears, throat, and mouth. If allergies are the cause, your doctor may do tests to find out what you are allergic to. You may be able to stop symptoms if you avoid the things that cause them. Your doctor may suggest or prescribe medicine to ease your symptoms. Follow-up care is a key part of your treatment and safety. Be sure to make and go to all appointments, and call your doctor if you are having problems. It's also a good idea to know your test results and keep a list of the medicines you take. How can you care for yourself at home? · If your rhinitis is caused by allergies, try to find out what sets off (triggers) your symptoms. Take steps to avoid these triggers. ¨ Avoid yard work. It can stir up both pollen and mold. ¨ Do not smoke or allow others to smoke around you. If you need help quitting, talk to your doctor about stop-smoking programs and medicines. These can increase your chances of quitting for good. ¨ Do not use aerosol sprays, cleaning products, or perfumes. ¨ If pollen is one of your triggers, close your house and car windows during blooming season. ¨ Clean your house often to control dust.  ¨ Keep pets outside. · If your doctor recommends over-the-counter medicines to relieve symptoms, take your medicines exactly as prescribed. Call your doctor if you think you are having a problem with your medicine. · Use saline (saltwater) nasal washes to help keep your nasal passages open and wash out mucus and bacteria. You can buy saline nose drops at a grocery store or drugstore. Or you can make your own at home by adding 1 teaspoon of salt and 1 teaspoon of baking soda to 2 cups of distilled water.  If you make your own, fill a bulb syringe with the solution, insert the tip into your nostril, and squeeze gently. Chaitanya Slocumb your nose. When should you call for help? Call your doctor now or seek immediate medical care if:    · You are having trouble breathing.    Watch closely for changes in your health, and be sure to contact your doctor if:    · Mucus from your nose gets thicker (like pus) or has new blood in it.     · You have new or worse symptoms.     · You do not get better as expected. Where can you learn more? Go to http://zuhair-tin.info/. Enter M030 in the search box to learn more about \"Rhinitis: Care Instructions. \"  Current as of: May 12, 2017  Content Version: 11.7  © 7654-3012 [employer]. Care instructions adapted under license by [employer] (which disclaims liability or warranty for this information). If you have questions about a medical condition or this instruction, always ask your healthcare professional. [address] any warranty or liability for your use of this information.

## 2018-07-15 NOTE — ED NOTES
Pt waiting on transport to [facility], spouse remains at bedside, call bell within reach, no needs expressed at this time